# Patient Record
Sex: FEMALE | Race: BLACK OR AFRICAN AMERICAN | Employment: FULL TIME | URBAN - METROPOLITAN AREA
[De-identification: names, ages, dates, MRNs, and addresses within clinical notes are randomized per-mention and may not be internally consistent; named-entity substitution may affect disease eponyms.]

---

## 2017-01-28 ENCOUNTER — HOSPITAL ENCOUNTER (OUTPATIENT)
Dept: OTHER | Age: 37
Discharge: HOME | End: 2017-01-28
Attending: INTERNAL MEDICINE | Admitting: INTERNAL MEDICINE

## 2017-01-28 LAB
24HR URINE VOLUME (ML): 2250 ML
BACTERIA: ABNORMAL /HPF
BASOPHILS ABSOLUTE: 0.1 K/UL (ref 0–0.2)
BASOPHILS RELATIVE PERCENT: 1 %
BILIRUBIN URINE: NEGATIVE
BLOOD, URINE: ABNORMAL
CHOLESTEROL, TOTAL: 432 MG/DL (ref 0–199)
CLARITY: ABNORMAL
COLOR: YELLOW
CREAT SERPL-MCNC: 0.6 MG/DL (ref 0.6–1.2)
CREATININE 24 HOUR URINE: 1.6 G/24HR (ref 0.6–1.5)
CREATININE CLEARANCE: 192 ML/MIN (ref 88–128)
EOSINOPHILS ABSOLUTE: 0.1 K/UL (ref 0–0.6)
EOSINOPHILS RELATIVE PERCENT: 1 %
EPITHELIAL CELLS, UA: 10 /HPF (ref 0–5)
GLUCOSE URINE: NEGATIVE MG/DL
HCT VFR BLD CALC: 36.8 % (ref 36–48)
HDLC SERPL-MCNC: 91 MG/DL (ref 40–60)
HEMOGLOBIN: 12.1 G/DL (ref 12–16)
HYALINE CASTS: 2 /HPF (ref 0–8)
IRON SATURATION: 24 % (ref 15–50)
IRON: 59 UG/DL (ref 37–145)
KETONES, URINE: NEGATIVE MG/DL
LDL CHOLESTEROL CALCULATED: ABNORMAL MG/DL
LDL CHOLESTEROL DIRECT: 310 MG/DL
LEUKOCYTE ESTERASE, URINE: NEGATIVE
LYMPHOCYTES ABSOLUTE: 3.7 K/UL (ref 1–5.1)
LYMPHOCYTES RELATIVE PERCENT: 38 %
Lab: 24 HR
MCH RBC QN AUTO: 27.4 PG (ref 26–34)
MCHC RBC AUTO-ENTMCNC: 32.8 G/DL (ref 31–36)
MCV RBC AUTO: 83.5 FL (ref 80–100)
MICROSCOPIC EXAMINATION: YES
MONOCYTES ABSOLUTE: 0.6 K/UL (ref 0–1.3)
MONOCYTES RELATIVE PERCENT: 6 %
NEUTROPHILS ABSOLUTE: 5.3 K/UL (ref 1.7–7.7)
NEUTROPHILS RELATIVE PERCENT: 54 %
NITRITE, URINE: NEGATIVE
PDW BLD-RTO: 15.7 % (ref 12.4–15.4)
PH UA: 5.5
PLATELET # BLD: 359 K/UL (ref 135–450)
PLATELET SLIDE REVIEW: ADEQUATE
PMV BLD AUTO: 8.1 FL (ref 5–10.5)
PROTEIN 24 HOUR URINE: 7.29 G/24HR
PROTEIN UA: >300 MG/DL
RBC # BLD: 4.41 M/UL (ref 4–5.2)
RBC # BLD: NORMAL 10*6/UL
RBC UA: 3 /HPF (ref 0–4)
SLIDE REVIEW: ABNORMAL
SPECIFIC GRAVITY UA: 1.02
TOTAL IRON BINDING CAPACITY: 249 UG/DL (ref 260–445)
TRIGL SERPL-MCNC: 322 MG/DL (ref 0–150)
URINE TYPE: ABNORMAL
UROBILINOGEN, URINE: 0.2 E.U./DL
VLDLC SERPL CALC-MCNC: ABNORMAL MG/DL
WBC # BLD: 9.8 K/UL (ref 4–11)
WBC UA: 7 /HPF (ref 0–5)

## 2017-03-11 ENCOUNTER — HOSPITAL ENCOUNTER (OUTPATIENT)
Dept: OTHER | Age: 37
Discharge: HOME | End: 2017-03-11
Attending: INTERNAL MEDICINE | Admitting: INTERNAL MEDICINE

## 2017-03-11 LAB
A/G RATIO: 1.6 (ref 1.1–2.2)
ALBUMIN SERPL-MCNC: 4.1 G/DL (ref 3.4–5)
ALP BLD-CCNC: 41 U/L (ref 40–129)
ALT SERPL-CCNC: 19 U/L (ref 10–40)
ANION GAP SERPL CALCULATED.3IONS-SCNC: 15 MMOL/L (ref 3–16)
AST SERPL-CCNC: 10 U/L (ref 15–37)
BILIRUB SERPL-MCNC: <0.2 MG/DL (ref 0–1)
BUN BLDV-MCNC: 10 MG/DL (ref 7–20)
CALCIUM SERPL-MCNC: 9.1 MG/DL (ref 8.3–10.6)
CHLORIDE BLD-SCNC: 103 MMOL/L (ref 99–110)
CO2: 25 MMOL/L (ref 21–32)
CREAT SERPL-MCNC: 0.7 MG/DL (ref 0.6–1.1)
GFR AFRICAN AMERICAN: >60
GFR NON-AFRICAN AMERICAN: >60
GLOBULIN: 2.5 G/DL
GLUCOSE BLD-MCNC: 116 MG/DL (ref 70–99)
POTASSIUM SERPL-SCNC: 3.9 MMOL/L (ref 3.5–5.1)
SODIUM BLD-SCNC: 143 MMOL/L (ref 136–145)
TOTAL PROTEIN: 6.6 G/DL (ref 6.4–8.2)

## 2017-03-14 LAB
CHOLESTEROL, TOTAL: 300 MG/DL (ref 0–199)
HDLC SERPL-MCNC: 131 MG/DL (ref 40–60)
IRON: 45 UG/DL (ref 37–145)
LDL CHOLESTEROL CALCULATED: 146 MG/DL
T4 FREE: 1 NG/DL (ref 0.9–1.8)
TRIGL SERPL-MCNC: 115 MG/DL (ref 0–150)
TSH SERPL DL<=0.05 MIU/L-ACNC: 0.78 UIU/ML (ref 0.27–4.2)
VITAMIN D 25-HYDROXY: 14 NG/ML
VLDLC SERPL CALC-MCNC: 23 MG/DL

## 2017-04-20 ENCOUNTER — HOSPITAL ENCOUNTER (OUTPATIENT)
Dept: OTHER | Age: 37
Discharge: HOME | End: 2017-04-20
Attending: FAMILY MEDICINE | Admitting: FAMILY MEDICINE

## 2017-04-20 ENCOUNTER — HOSPITAL ENCOUNTER (OUTPATIENT)
Dept: OTHER | Age: 37
Discharge: HOME | End: 2017-04-20
Attending: INTERNAL MEDICINE | Admitting: INTERNAL MEDICINE

## 2017-04-20 LAB
ALBUMIN SERPL-MCNC: 4.4 G/DL (ref 3.4–5)
ANION GAP SERPL CALCULATED.3IONS-SCNC: 16 MMOL/L (ref 3–16)
BUN BLDV-MCNC: 5 MG/DL (ref 7–20)
CALCIUM SERPL-MCNC: 9.2 MG/DL (ref 8.3–10.6)
CHLORIDE BLD-SCNC: 98 MMOL/L (ref 99–110)
CHOLESTEROL, TOTAL: 294 MG/DL (ref 0–199)
CO2: 24 MMOL/L (ref 21–32)
CREAT SERPL-MCNC: 0.5 MG/DL (ref 0.6–1.1)
GFR AFRICAN AMERICAN: >60
GFR NON-AFRICAN AMERICAN: >60
GLUCOSE BLD-MCNC: 231 MG/DL (ref 70–99)
HCT VFR BLD CALC: 37.4 % (ref 36–48)
HDLC SERPL-MCNC: 55 MG/DL (ref 40–60)
HEMOGLOBIN: 12.5 G/DL (ref 12–16)
IRON: 81 UG/DL (ref 37–145)
LDL CHOLESTEROL CALCULATED: ABNORMAL MG/DL
LDL CHOLESTEROL DIRECT: 181 MG/DL
MCH RBC QN AUTO: 27.9 PG (ref 26–34)
MCHC RBC AUTO-ENTMCNC: 33.3 G/DL (ref 31–36)
MCV RBC AUTO: 83.7 FL (ref 80–100)
PDW BLD-RTO: 14.4 % (ref 12.4–15.4)
PHOSPHORUS: 2.5 MG/DL (ref 2.5–4.9)
PLATELET # BLD: 287 K/UL (ref 135–450)
PMV BLD AUTO: 8.6 FL (ref 5–10.5)
POTASSIUM SERPL-SCNC: 4.1 MMOL/L (ref 3.5–5.1)
RBC # BLD: 4.47 M/UL (ref 4–5.2)
SODIUM BLD-SCNC: 138 MMOL/L (ref 136–145)
T4 FREE: 0.9 NG/DL (ref 0.9–1.8)
TRIGL SERPL-MCNC: 393 MG/DL (ref 0–150)
TSH SERPL DL<=0.05 MIU/L-ACNC: 0.39 UIU/ML (ref 0.27–4.2)
VITAMIN D 25-HYDROXY: 14.9 NG/ML
VLDLC SERPL CALC-MCNC: ABNORMAL MG/DL
WBC # BLD: 6.9 K/UL (ref 4–11)

## 2017-04-21 LAB
ESTIMATED AVERAGE GLUCOSE: 274.7 MG/DL
HBA1C MFR BLD: 11.2 %

## 2017-06-27 ENCOUNTER — HOSPITAL ENCOUNTER (OUTPATIENT)
Dept: OTHER | Age: 37
Discharge: HOME | End: 2017-06-27
Attending: INTERNAL MEDICINE | Admitting: INTERNAL MEDICINE

## 2017-06-27 LAB
ALBUMIN SERPL-MCNC: 4.2 G/DL (ref 3.4–5)
ANION GAP SERPL CALCULATED.3IONS-SCNC: 15 MMOL/L (ref 3–16)
BUN BLDV-MCNC: 6 MG/DL (ref 7–20)
CALCIUM SERPL-MCNC: 8.9 MG/DL (ref 8.3–10.6)
CHLORIDE BLD-SCNC: 101 MMOL/L (ref 99–110)
CO2: 24 MMOL/L (ref 21–32)
CREAT SERPL-MCNC: 0.5 MG/DL (ref 0.6–1.1)
GFR AFRICAN AMERICAN: >60
GFR NON-AFRICAN AMERICAN: >60
GLUCOSE BLD-MCNC: 135 MG/DL (ref 70–99)
MAGNESIUM: 1.8 MG/DL (ref 1.8–2.4)
PHOSPHORUS: 3.2 MG/DL (ref 2.5–4.9)
POTASSIUM SERPL-SCNC: 4.2 MMOL/L (ref 3.5–5.1)
SODIUM BLD-SCNC: 140 MMOL/L (ref 136–145)

## 2018-01-04 ENCOUNTER — HOSPITAL ENCOUNTER (OUTPATIENT)
Dept: OTHER | Age: 38
Discharge: HOME | End: 2018-01-04
Attending: FAMILY MEDICINE | Admitting: FAMILY MEDICINE

## 2018-01-04 LAB
A/G RATIO: 1.4 (ref 1.1–2.2)
ALBUMIN SERPL-MCNC: 4.3 G/DL (ref 3.4–5)
ALP BLD-CCNC: 61 U/L (ref 40–129)
ALT SERPL-CCNC: 13 U/L (ref 10–40)
ANION GAP SERPL CALCULATED.3IONS-SCNC: 16 MMOL/L (ref 3–16)
AST SERPL-CCNC: 11 U/L (ref 15–37)
BILIRUB SERPL-MCNC: 0.4 MG/DL (ref 0–1)
BUN BLDV-MCNC: 9 MG/DL (ref 7–20)
CALCIUM SERPL-MCNC: 9.4 MG/DL (ref 8.3–10.6)
CHLORIDE BLD-SCNC: 101 MMOL/L (ref 99–110)
CHOLESTEROL, TOTAL: 224 MG/DL (ref 0–199)
CO2: 23 MMOL/L (ref 21–32)
CREAT SERPL-MCNC: 0.6 MG/DL (ref 0.6–1.1)
GFR AFRICAN AMERICAN: >60
GFR NON-AFRICAN AMERICAN: >60
GLOBULIN: 3.1 G/DL
GLUCOSE BLD-MCNC: 155 MG/DL (ref 70–99)
HCT VFR BLD CALC: 36.1 % (ref 36–48)
HDLC SERPL-MCNC: 66 MG/DL (ref 40–60)
HEMOGLOBIN: 11.9 G/DL (ref 12–16)
IRON: 73 UG/DL (ref 37–145)
LDL CHOLESTEROL CALCULATED: 122 MG/DL
MCH RBC QN AUTO: 26.9 PG (ref 26–34)
MCHC RBC AUTO-ENTMCNC: 33 G/DL (ref 31–36)
MCV RBC AUTO: 81.4 FL (ref 80–100)
PDW BLD-RTO: 15.8 % (ref 12.4–15.4)
PLATELET # BLD: 334 K/UL (ref 135–450)
PMV BLD AUTO: 8.2 FL (ref 5–10.5)
POTASSIUM SERPL-SCNC: 4.3 MMOL/L (ref 3.5–5.1)
RBC # BLD: 4.43 M/UL (ref 4–5.2)
SODIUM BLD-SCNC: 140 MMOL/L (ref 136–145)
T4 FREE: 0.9 NG/DL (ref 0.9–1.8)
TOTAL PROTEIN: 7.4 G/DL (ref 6.4–8.2)
TRIGL SERPL-MCNC: 180 MG/DL (ref 0–150)
TSH SERPL DL<=0.05 MIU/L-ACNC: 1.27 UIU/ML (ref 0.27–4.2)
VITAMIN D 25-HYDROXY: 21.4 NG/ML
VLDLC SERPL CALC-MCNC: 36 MG/DL
WBC # BLD: 5.9 K/UL (ref 4–11)

## 2018-01-05 LAB
ANA INTERPRETATION: NORMAL
ANTI-NUCLEAR ANTIBODY (ANA): NEGATIVE
ESTIMATED AVERAGE GLUCOSE: 203 MG/DL
HBA1C MFR BLD: 8.7 %

## 2018-05-24 ENCOUNTER — HOSPITAL ENCOUNTER (OUTPATIENT)
Dept: OTHER | Age: 38
Discharge: HOME | End: 2018-05-24
Attending: FAMILY MEDICINE | Admitting: FAMILY MEDICINE

## 2018-05-24 LAB
A/G RATIO: 1.4 (ref 1.1–2.2)
ALBUMIN SERPL-MCNC: 4 G/DL (ref 3.4–5)
ALP BLD-CCNC: 55 U/L (ref 40–129)
ALT SERPL-CCNC: 13 U/L (ref 10–40)
ANION GAP SERPL CALCULATED.3IONS-SCNC: 16 MMOL/L (ref 3–16)
AST SERPL-CCNC: 16 U/L (ref 15–37)
BILIRUB SERPL-MCNC: 0.4 MG/DL (ref 0–1)
BUN BLDV-MCNC: 7 MG/DL (ref 7–20)
CALCIUM SERPL-MCNC: 8.7 MG/DL (ref 8.3–10.6)
CHLORIDE BLD-SCNC: 101 MMOL/L (ref 99–110)
CHOLESTEROL, TOTAL: 244 MG/DL (ref 0–199)
CO2: 24 MMOL/L (ref 21–32)
CREAT SERPL-MCNC: 0.6 MG/DL (ref 0.6–1.1)
ESTIMATED AVERAGE GLUCOSE: 180 MG/DL
GFR AFRICAN AMERICAN: >60
GFR NON-AFRICAN AMERICAN: >60
GLOBULIN: 2.8 G/DL
GLUCOSE BLD-MCNC: 165 MG/DL (ref 70–99)
HBA1C MFR BLD: 7.9 %
HDLC SERPL-MCNC: 59 MG/DL (ref 40–60)
LDL CHOLESTEROL CALCULATED: 152 MG/DL
POTASSIUM SERPL-SCNC: 4.3 MMOL/L (ref 3.5–5.1)
SODIUM BLD-SCNC: 141 MMOL/L (ref 136–145)
TOTAL PROTEIN: 6.8 G/DL (ref 6.4–8.2)
TRIGL SERPL-MCNC: 167 MG/DL (ref 0–150)
VITAMIN B-12: 446 PG/ML (ref 211–911)
VITAMIN D 25-HYDROXY: 18.8 NG/ML
VLDLC SERPL CALC-MCNC: 33 MG/DL

## 2018-05-25 LAB
ANA INTERPRETATION: NORMAL
ANTI-NUCLEAR ANTIBODY (ANA): NEGATIVE

## 2018-12-08 ENCOUNTER — HOSPITAL ENCOUNTER (OUTPATIENT)
Age: 38
Discharge: HOME | End: 2018-12-08
Payer: COMMERCIAL

## 2018-12-08 LAB
A/G RATIO: 1.2 (ref 1.1–2.2)
ALBUMIN SERPL-MCNC: 4.1 G/DL (ref 3.4–5)
ALP BLD-CCNC: 68 U/L (ref 40–129)
ALT SERPL-CCNC: 10 U/L (ref 10–40)
ANION GAP SERPL CALCULATED.3IONS-SCNC: 14 MMOL/L (ref 3–16)
AST SERPL-CCNC: 13 U/L (ref 15–37)
BACTERIA: ABNORMAL /HPF
BILIRUB SERPL-MCNC: 0.4 MG/DL (ref 0–1)
BILIRUBIN URINE: NEGATIVE
BLOOD, URINE: NEGATIVE
BUN BLDV-MCNC: 8 MG/DL (ref 7–20)
CALCIUM SERPL-MCNC: 9.8 MG/DL (ref 8.3–10.6)
CHLORIDE BLD-SCNC: 105 MMOL/L (ref 99–110)
CHOLESTEROL, TOTAL: 183 MG/DL (ref 0–199)
CLARITY: ABNORMAL
CO2: 23 MMOL/L (ref 21–32)
COLOR: YELLOW
CREAT SERPL-MCNC: 0.7 MG/DL (ref 0.6–1.1)
CREATININE URINE: 169.9 MG/DL (ref 28–259)
EPITHELIAL CELLS, UA: 5 /HPF (ref 0–5)
FERRITIN: 26.9 NG/ML (ref 15–150)
GFR AFRICAN AMERICAN: >60
GFR NON-AFRICAN AMERICAN: >60
GLOBULIN: 3.3 G/DL
GLUCOSE BLD-MCNC: 141 MG/DL (ref 70–99)
GLUCOSE URINE: NEGATIVE MG/DL
HCT VFR BLD CALC: 37.3 % (ref 36–48)
HDLC SERPL-MCNC: 67 MG/DL (ref 40–60)
HEMOGLOBIN: 12.3 G/DL (ref 12–16)
HYALINE CASTS: 0 /LPF (ref 0–8)
IRON SATURATION: 19 % (ref 15–50)
IRON: 81 UG/DL (ref 37–145)
KETONES, URINE: NEGATIVE MG/DL
LDL CHOLESTEROL CALCULATED: 92 MG/DL
LEUKOCYTE ESTERASE, URINE: ABNORMAL
MCH RBC QN AUTO: 25.7 PG (ref 26–34)
MCHC RBC AUTO-ENTMCNC: 33 G/DL (ref 31–36)
MCV RBC AUTO: 77.7 FL (ref 80–100)
MICROSCOPIC EXAMINATION: YES
NITRITE, URINE: NEGATIVE
PDW BLD-RTO: 15.2 % (ref 12.4–15.4)
PH UA: 5.5
PHOSPHORUS: 3.7 MG/DL (ref 2.5–4.9)
PLATELET # BLD: 351 K/UL (ref 135–450)
PMV BLD AUTO: 8.4 FL (ref 5–10.5)
POTASSIUM SERPL-SCNC: 4.4 MMOL/L (ref 3.5–5.1)
PROTEIN PROTEIN: 18 MG/DL
PROTEIN UA: NEGATIVE MG/DL
RBC # BLD: 4.79 M/UL (ref 4–5.2)
RBC UA: 3 /HPF (ref 0–4)
SODIUM BLD-SCNC: 142 MMOL/L (ref 136–145)
SPECIFIC GRAVITY UA: 1.02
TOTAL IRON BINDING CAPACITY: 419 UG/DL (ref 260–445)
TOTAL PROTEIN: 7.4 G/DL (ref 6.4–8.2)
TRIGL SERPL-MCNC: 121 MG/DL (ref 0–150)
URINE TYPE: ABNORMAL
UROBILINOGEN, URINE: 0.2 E.U./DL
VITAMIN D 25-HYDROXY: 22.4 NG/ML
VLDLC SERPL CALC-MCNC: 24 MG/DL
WBC # BLD: 5.9 K/UL (ref 4–11)
WBC UA: 5 /HPF (ref 0–5)

## 2018-12-08 PROCEDURE — 82306 VITAMIN D 25 HYDROXY: CPT

## 2018-12-08 PROCEDURE — 80061 LIPID PANEL: CPT

## 2018-12-08 PROCEDURE — 81001 URINALYSIS AUTO W/SCOPE: CPT

## 2018-12-08 PROCEDURE — 85027 COMPLETE CBC AUTOMATED: CPT

## 2018-12-08 PROCEDURE — 83550 IRON BINDING TEST: CPT

## 2018-12-08 PROCEDURE — 83036 HEMOGLOBIN GLYCOSYLATED A1C: CPT

## 2018-12-08 PROCEDURE — 36415 COLL VENOUS BLD VENIPUNCTURE: CPT

## 2018-12-08 PROCEDURE — 83540 ASSAY OF IRON: CPT

## 2018-12-08 PROCEDURE — 84100 ASSAY OF PHOSPHORUS: CPT

## 2018-12-08 PROCEDURE — 82728 ASSAY OF FERRITIN: CPT

## 2018-12-08 PROCEDURE — 82570 ASSAY OF URINE CREATININE: CPT

## 2018-12-08 PROCEDURE — 84156 ASSAY OF PROTEIN URINE: CPT

## 2018-12-08 PROCEDURE — 80053 COMPREHEN METABOLIC PANEL: CPT

## 2018-12-09 LAB
ESTIMATED AVERAGE GLUCOSE: 200.1 MG/DL
HBA1C MFR BLD: 8.6 %

## 2019-04-18 PROBLEM — I10 ESSENTIAL HYPERTENSION: Status: ACTIVE | Noted: 2019-04-18

## 2020-01-15 ENCOUNTER — APPOINTMENT (OUTPATIENT)
Dept: GENERAL RADIOLOGY | Age: 40
End: 2020-01-15

## 2020-01-15 ENCOUNTER — HOSPITAL ENCOUNTER (OUTPATIENT)
Age: 40
Setting detail: OBSERVATION
Discharge: HOME | End: 2020-01-16
Attending: EMERGENCY MEDICINE | Admitting: HOSPITALIST

## 2020-01-15 PROBLEM — R55 SYNCOPE AND COLLAPSE: Status: ACTIVE | Noted: 2020-01-15

## 2020-01-15 LAB
ANION GAP SERPL CALCULATED.3IONS-SCNC: 13 MMOL/L (ref 3–16)
BACTERIA: ABNORMAL /HPF
BASOPHILS ABSOLUTE: 0.1 K/UL (ref 0–0.2)
BASOPHILS RELATIVE PERCENT: 0.9 %
BILIRUBIN URINE: NEGATIVE
BLOOD, URINE: ABNORMAL
BUN BLDV-MCNC: 15 MG/DL (ref 7–20)
CALCIUM SERPL-MCNC: 8.2 MG/DL (ref 8.3–10.6)
CHLORIDE BLD-SCNC: 89 MMOL/L (ref 99–110)
CLARITY: ABNORMAL
CO2: 21 MMOL/L (ref 21–32)
COLOR: YELLOW
COMMENT UA: ABNORMAL
CREAT SERPL-MCNC: 0.8 MG/DL (ref 0.6–1.1)
EOSINOPHILS ABSOLUTE: 0 K/UL (ref 0–0.6)
EOSINOPHILS RELATIVE PERCENT: 0.1 %
EPITHELIAL CELLS, UA: ABNORMAL /HPF
GFR AFRICAN AMERICAN: >60
GFR NON-AFRICAN AMERICAN: >60
GLUCOSE BLD-MCNC: 358 MG/DL (ref 70–99)
GLUCOSE BLD-MCNC: 392 MG/DL (ref 70–99)
GLUCOSE BLD-MCNC: 418 MG/DL (ref 70–99)
GLUCOSE URINE: >=1000 MG/DL
HCG QUALITATIVE: NEGATIVE
HCT VFR BLD CALC: 38.8 % (ref 36–48)
HEMOGLOBIN: 12.8 G/DL (ref 12–16)
KETONES, URINE: NEGATIVE MG/DL
LEUKOCYTE ESTERASE, URINE: NEGATIVE
LIPASE: 34 U/L (ref 13–60)
LYMPHOCYTES ABSOLUTE: 2.1 K/UL (ref 1–5.1)
LYMPHOCYTES RELATIVE PERCENT: 30.7 %
MCH RBC QN AUTO: 25.3 PG (ref 26–34)
MCHC RBC AUTO-ENTMCNC: 32.9 G/DL (ref 31–36)
MCV RBC AUTO: 76.9 FL (ref 80–100)
MICROSCOPIC EXAMINATION: YES
MONOCYTES ABSOLUTE: 0.5 K/UL (ref 0–1.3)
MONOCYTES RELATIVE PERCENT: 7.3 %
NEUTROPHILS ABSOLUTE: 4.2 K/UL (ref 1.7–7.7)
NEUTROPHILS RELATIVE PERCENT: 61 %
NITRITE, URINE: NEGATIVE
PDW BLD-RTO: 16.7 % (ref 12.4–15.4)
PERFORMED ON: ABNORMAL
PERFORMED ON: ABNORMAL
PH UA: 6.5 (ref 5–8)
PLATELET # BLD: 476 K/UL (ref 135–450)
PMV BLD AUTO: 7.5 FL (ref 5–10.5)
POTASSIUM SERPL-SCNC: 4.2 MMOL/L (ref 3.5–5.1)
PROTEIN UA: >300 MG/DL
RBC # BLD: 5.05 M/UL (ref 4–5.2)
RBC UA: ABNORMAL /HPF (ref 0–2)
REASON FOR REJECTION: NORMAL
REJECTED TEST: NORMAL
SODIUM BLD-SCNC: 123 MMOL/L (ref 136–145)
SPECIFIC GRAVITY UA: >1.03 (ref 1–1.03)
TROPONIN: <0.01 NG/ML
URINE REFLEX TO CULTURE: ABNORMAL
URINE TYPE: ABNORMAL
UROBILINOGEN, URINE: 0.2 E.U./DL
WBC # BLD: 6.8 K/UL (ref 4–11)
WBC UA: ABNORMAL /HPF (ref 0–5)
YEAST: PRESENT /HPF

## 2020-01-15 PROCEDURE — 6370000000 HC RX 637 (ALT 250 FOR IP): Performed by: EMERGENCY MEDICINE

## 2020-01-15 PROCEDURE — 83036 HEMOGLOBIN GLYCOSYLATED A1C: CPT

## 2020-01-15 PROCEDURE — 84703 CHORIONIC GONADOTROPIN ASSAY: CPT

## 2020-01-15 PROCEDURE — 71046 X-RAY EXAM CHEST 2 VIEWS: CPT

## 2020-01-15 PROCEDURE — 6360000002 HC RX W HCPCS: Performed by: EMERGENCY MEDICINE

## 2020-01-15 PROCEDURE — 83690 ASSAY OF LIPASE: CPT

## 2020-01-15 PROCEDURE — 94640 AIRWAY INHALATION TREATMENT: CPT

## 2020-01-15 PROCEDURE — 80048 BASIC METABOLIC PNL TOTAL CA: CPT

## 2020-01-15 PROCEDURE — 93005 ELECTROCARDIOGRAM TRACING: CPT | Performed by: EMERGENCY MEDICINE

## 2020-01-15 PROCEDURE — 96374 THER/PROPH/DIAG INJ IV PUSH: CPT

## 2020-01-15 PROCEDURE — G0378 HOSPITAL OBSERVATION PER HR: HCPCS

## 2020-01-15 PROCEDURE — 2580000003 HC RX 258: Performed by: EMERGENCY MEDICINE

## 2020-01-15 PROCEDURE — 84484 ASSAY OF TROPONIN QUANT: CPT

## 2020-01-15 PROCEDURE — 36415 COLL VENOUS BLD VENIPUNCTURE: CPT

## 2020-01-15 PROCEDURE — 99285 EMERGENCY DEPT VISIT HI MDM: CPT

## 2020-01-15 PROCEDURE — 94761 N-INVAS EAR/PLS OXIMETRY MLT: CPT

## 2020-01-15 PROCEDURE — 85025 COMPLETE CBC W/AUTO DIFF WBC: CPT

## 2020-01-15 PROCEDURE — 81001 URINALYSIS AUTO W/SCOPE: CPT

## 2020-01-15 RX ORDER — INSULIN LISPRO 100 [IU]/ML
0-6 INJECTION, SOLUTION INTRAVENOUS; SUBCUTANEOUS NIGHTLY
Status: DISCONTINUED | OUTPATIENT
Start: 2020-01-16 | End: 2020-01-16 | Stop reason: DRUGHIGH

## 2020-01-15 RX ORDER — MAGNESIUM SULFATE 1 G/100ML
1 INJECTION INTRAVENOUS PRN
Status: DISCONTINUED | OUTPATIENT
Start: 2020-01-15 | End: 2020-01-16 | Stop reason: HOSPADM

## 2020-01-15 RX ORDER — DEXTROSE MONOHYDRATE 50 MG/ML
100 INJECTION, SOLUTION INTRAVENOUS PRN
Status: DISCONTINUED | OUTPATIENT
Start: 2020-01-15 | End: 2020-01-16 | Stop reason: HOSPADM

## 2020-01-15 RX ORDER — METHYLPREDNISOLONE SODIUM SUCCINATE 125 MG/2ML
125 INJECTION, POWDER, LYOPHILIZED, FOR SOLUTION INTRAMUSCULAR; INTRAVENOUS ONCE
Status: COMPLETED | OUTPATIENT
Start: 2020-01-15 | End: 2020-01-15

## 2020-01-15 RX ORDER — POTASSIUM CHLORIDE 7.45 MG/ML
10 INJECTION INTRAVENOUS PRN
Status: DISCONTINUED | OUTPATIENT
Start: 2020-01-15 | End: 2020-01-16 | Stop reason: HOSPADM

## 2020-01-15 RX ORDER — NICOTINE POLACRILEX 4 MG
15 LOZENGE BUCCAL PRN
Status: DISCONTINUED | OUTPATIENT
Start: 2020-01-15 | End: 2020-01-16 | Stop reason: HOSPADM

## 2020-01-15 RX ORDER — 0.9 % SODIUM CHLORIDE 0.9 %
1000 INTRAVENOUS SOLUTION INTRAVENOUS ONCE
Status: COMPLETED | OUTPATIENT
Start: 2020-01-15 | End: 2020-01-15

## 2020-01-15 RX ORDER — ALBUTEROL SULFATE 2.5 MG/3ML
5 SOLUTION RESPIRATORY (INHALATION) ONCE
Status: COMPLETED | OUTPATIENT
Start: 2020-01-15 | End: 2020-01-15

## 2020-01-15 RX ORDER — INSULIN LISPRO 100 [IU]/ML
0-12 INJECTION, SOLUTION INTRAVENOUS; SUBCUTANEOUS
Status: DISCONTINUED | OUTPATIENT
Start: 2020-01-16 | End: 2020-01-16 | Stop reason: DRUGHIGH

## 2020-01-15 RX ORDER — DEXTROSE MONOHYDRATE 25 G/50ML
12.5 INJECTION, SOLUTION INTRAVENOUS PRN
Status: DISCONTINUED | OUTPATIENT
Start: 2020-01-15 | End: 2020-01-16 | Stop reason: HOSPADM

## 2020-01-15 RX ORDER — ONDANSETRON 2 MG/ML
4 INJECTION INTRAMUSCULAR; INTRAVENOUS EVERY 6 HOURS PRN
Status: DISCONTINUED | OUTPATIENT
Start: 2020-01-15 | End: 2020-01-16 | Stop reason: HOSPADM

## 2020-01-15 RX ORDER — POTASSIUM CHLORIDE 20 MEQ/1
40 TABLET, EXTENDED RELEASE ORAL PRN
Status: DISCONTINUED | OUTPATIENT
Start: 2020-01-15 | End: 2020-01-16 | Stop reason: HOSPADM

## 2020-01-15 RX ORDER — FLUCONAZOLE 100 MG/1
150 TABLET ORAL ONCE
Status: COMPLETED | OUTPATIENT
Start: 2020-01-15 | End: 2020-01-15

## 2020-01-15 RX ORDER — IPRATROPIUM BROMIDE AND ALBUTEROL SULFATE 2.5; .5 MG/3ML; MG/3ML
1 SOLUTION RESPIRATORY (INHALATION) EVERY 6 HOURS
Status: DISCONTINUED | OUTPATIENT
Start: 2020-01-16 | End: 2020-01-16 | Stop reason: HOSPADM

## 2020-01-15 RX ORDER — IPRATROPIUM BROMIDE AND ALBUTEROL SULFATE 2.5; .5 MG/3ML; MG/3ML
1 SOLUTION RESPIRATORY (INHALATION) ONCE
Status: COMPLETED | OUTPATIENT
Start: 2020-01-15 | End: 2020-01-15

## 2020-01-15 RX ORDER — IPRATROPIUM BROMIDE AND ALBUTEROL SULFATE 2.5; .5 MG/3ML; MG/3ML
1 SOLUTION RESPIRATORY (INHALATION) EVERY 4 HOURS PRN
Status: DISCONTINUED | OUTPATIENT
Start: 2020-01-15 | End: 2020-01-16 | Stop reason: HOSPADM

## 2020-01-15 RX ORDER — SODIUM CHLORIDE 0.9 % (FLUSH) 0.9 %
10 SYRINGE (ML) INJECTION EVERY 12 HOURS SCHEDULED
Status: DISCONTINUED | OUTPATIENT
Start: 2020-01-16 | End: 2020-01-16 | Stop reason: HOSPADM

## 2020-01-15 RX ORDER — FAMOTIDINE 20 MG/1
20 TABLET, FILM COATED ORAL 2 TIMES DAILY
Status: DISCONTINUED | OUTPATIENT
Start: 2020-01-16 | End: 2020-01-16 | Stop reason: HOSPADM

## 2020-01-15 RX ORDER — ACETAMINOPHEN 325 MG/1
650 TABLET ORAL EVERY 4 HOURS PRN
Status: DISCONTINUED | OUTPATIENT
Start: 2020-01-15 | End: 2020-01-16 | Stop reason: HOSPADM

## 2020-01-15 RX ORDER — SODIUM CHLORIDE 0.9 % (FLUSH) 0.9 %
10 SYRINGE (ML) INJECTION PRN
Status: DISCONTINUED | OUTPATIENT
Start: 2020-01-15 | End: 2020-01-16 | Stop reason: HOSPADM

## 2020-01-15 RX ORDER — SODIUM CHLORIDE 9 MG/ML
INJECTION, SOLUTION INTRAVENOUS CONTINUOUS
Status: DISCONTINUED | OUTPATIENT
Start: 2020-01-16 | End: 2020-01-16 | Stop reason: HOSPADM

## 2020-01-15 RX ADMIN — SODIUM CHLORIDE 1000 ML: 9 INJECTION, SOLUTION INTRAVENOUS at 21:41

## 2020-01-15 RX ADMIN — IPRATROPIUM BROMIDE AND ALBUTEROL SULFATE 1 AMPULE: .5; 3 SOLUTION RESPIRATORY (INHALATION) at 20:30

## 2020-01-15 RX ADMIN — ALBUTEROL SULFATE 5 MG: 2.5 SOLUTION RESPIRATORY (INHALATION) at 20:30

## 2020-01-15 RX ADMIN — METHYLPREDNISOLONE SODIUM SUCCINATE 125 MG: 125 INJECTION, POWDER, FOR SOLUTION INTRAMUSCULAR; INTRAVENOUS at 21:41

## 2020-01-15 RX ADMIN — FLUCONAZOLE 150 MG: 100 TABLET ORAL at 22:30

## 2020-01-15 ASSESSMENT — PAIN SCALES - GENERAL: PAINLEVEL_OUTOF10: 10

## 2020-01-15 ASSESSMENT — PAIN DESCRIPTION - LOCATION: LOCATION: ABDOMEN

## 2020-01-15 ASSESSMENT — PAIN DESCRIPTION - ORIENTATION: ORIENTATION: LOWER

## 2020-01-15 ASSESSMENT — PAIN DESCRIPTION - PAIN TYPE: TYPE: ACUTE PAIN

## 2020-01-15 NOTE — ED NOTES
Bed: 21  Expected date:   Expected time:   Means of arrival:   Comments:  Medic 4569 Chivo Hernandez RN  01/15/20 9369

## 2020-01-16 VITALS
HEIGHT: 59 IN | OXYGEN SATURATION: 99 % | DIASTOLIC BLOOD PRESSURE: 89 MMHG | SYSTOLIC BLOOD PRESSURE: 135 MMHG | RESPIRATION RATE: 16 BRPM | WEIGHT: 153.2 LBS | TEMPERATURE: 97.7 F | BODY MASS INDEX: 30.88 KG/M2 | HEART RATE: 90 BPM

## 2020-01-16 LAB
A/G RATIO: 0.3 (ref 1.1–2.2)
ALBUMIN SERPL-MCNC: 1.1 G/DL (ref 3.4–5)
ALP BLD-CCNC: 72 U/L (ref 40–129)
ALT SERPL-CCNC: 11 U/L (ref 10–40)
ANION GAP SERPL CALCULATED.3IONS-SCNC: 13 MMOL/L (ref 3–16)
AST SERPL-CCNC: 14 U/L (ref 15–37)
BASOPHILS ABSOLUTE: 0 K/UL (ref 0–0.2)
BASOPHILS RELATIVE PERCENT: 0.4 %
BILIRUB SERPL-MCNC: <0.2 MG/DL (ref 0–1)
BUN BLDV-MCNC: 14 MG/DL (ref 7–20)
CALCIUM SERPL-MCNC: 7.6 MG/DL (ref 8.3–10.6)
CHLORIDE BLD-SCNC: 98 MMOL/L (ref 99–110)
CO2: 18 MMOL/L (ref 21–32)
CREAT SERPL-MCNC: 0.7 MG/DL (ref 0.6–1.1)
EKG ATRIAL RATE: 97 BPM
EKG DIAGNOSIS: NORMAL
EKG P AXIS: 32 DEGREES
EKG P-R INTERVAL: 140 MS
EKG Q-T INTERVAL: 362 MS
EKG QRS DURATION: 82 MS
EKG QTC CALCULATION (BAZETT): 459 MS
EKG R AXIS: 27 DEGREES
EKG T AXIS: 0 DEGREES
EKG VENTRICULAR RATE: 97 BPM
EOSINOPHILS ABSOLUTE: 0 K/UL (ref 0–0.6)
EOSINOPHILS RELATIVE PERCENT: 0 %
ESTIMATED AVERAGE GLUCOSE: 257.5 MG/DL
GFR AFRICAN AMERICAN: >60
GFR NON-AFRICAN AMERICAN: >60
GLOBULIN: 3.8 G/DL
GLUCOSE BLD-MCNC: 332 MG/DL (ref 70–99)
GLUCOSE BLD-MCNC: 387 MG/DL (ref 70–99)
GLUCOSE BLD-MCNC: 436 MG/DL (ref 70–99)
GLUCOSE BLD-MCNC: 491 MG/DL (ref 70–99)
HBA1C MFR BLD: 10.6 %
HCT VFR BLD CALC: 34.6 % (ref 36–48)
HEMOGLOBIN: 11.5 G/DL (ref 12–16)
LV EF: 55 %
LVEF MODALITY: NORMAL
LYMPHOCYTES ABSOLUTE: 0.8 K/UL (ref 1–5.1)
LYMPHOCYTES RELATIVE PERCENT: 18.4 %
MAGNESIUM: 1.8 MG/DL (ref 1.8–2.4)
MCH RBC QN AUTO: 25.3 PG (ref 26–34)
MCHC RBC AUTO-ENTMCNC: 33.3 G/DL (ref 31–36)
MCV RBC AUTO: 75.9 FL (ref 80–100)
MONOCYTES ABSOLUTE: 0.1 K/UL (ref 0–1.3)
MONOCYTES RELATIVE PERCENT: 2 %
NEUTROPHILS ABSOLUTE: 3.6 K/UL (ref 1.7–7.7)
NEUTROPHILS RELATIVE PERCENT: 79.2 %
PDW BLD-RTO: 16.2 % (ref 12.4–15.4)
PERFORMED ON: ABNORMAL
PLATELET # BLD: 430 K/UL (ref 135–450)
PMV BLD AUTO: 7.5 FL (ref 5–10.5)
POTASSIUM REFLEX MAGNESIUM: 4.3 MMOL/L (ref 3.5–5.1)
RBC # BLD: 4.56 M/UL (ref 4–5.2)
SODIUM BLD-SCNC: 129 MMOL/L (ref 136–145)
TOTAL PROTEIN: 4.9 G/DL (ref 6.4–8.2)
TROPONIN: <0.01 NG/ML
TROPONIN: <0.01 NG/ML
WBC # BLD: 4.5 K/UL (ref 4–11)

## 2020-01-16 PROCEDURE — 85025 COMPLETE CBC W/AUTO DIFF WBC: CPT

## 2020-01-16 PROCEDURE — 6370000000 HC RX 637 (ALT 250 FOR IP): Performed by: HOSPITALIST

## 2020-01-16 PROCEDURE — 93880 EXTRACRANIAL BILAT STUDY: CPT

## 2020-01-16 PROCEDURE — 83036 HEMOGLOBIN GLYCOSYLATED A1C: CPT

## 2020-01-16 PROCEDURE — 6370000000 HC RX 637 (ALT 250 FOR IP): Performed by: INTERNAL MEDICINE

## 2020-01-16 PROCEDURE — 93010 ELECTROCARDIOGRAM REPORT: CPT | Performed by: INTERNAL MEDICINE

## 2020-01-16 PROCEDURE — 2580000003 HC RX 258: Performed by: HOSPITALIST

## 2020-01-16 PROCEDURE — 97116 GAIT TRAINING THERAPY: CPT

## 2020-01-16 PROCEDURE — 83735 ASSAY OF MAGNESIUM: CPT

## 2020-01-16 PROCEDURE — 96372 THER/PROPH/DIAG INJ SC/IM: CPT

## 2020-01-16 PROCEDURE — 94761 N-INVAS EAR/PLS OXIMETRY MLT: CPT

## 2020-01-16 PROCEDURE — G0378 HOSPITAL OBSERVATION PER HR: HCPCS

## 2020-01-16 PROCEDURE — 97161 PT EVAL LOW COMPLEX 20 MIN: CPT

## 2020-01-16 PROCEDURE — 84484 ASSAY OF TROPONIN QUANT: CPT

## 2020-01-16 PROCEDURE — 80053 COMPREHEN METABOLIC PANEL: CPT

## 2020-01-16 PROCEDURE — 6360000002 HC RX W HCPCS: Performed by: HOSPITALIST

## 2020-01-16 PROCEDURE — 93306 TTE W/DOPPLER COMPLETE: CPT

## 2020-01-16 PROCEDURE — 94640 AIRWAY INHALATION TREATMENT: CPT

## 2020-01-16 PROCEDURE — 36415 COLL VENOUS BLD VENIPUNCTURE: CPT

## 2020-01-16 RX ORDER — INSULIN LISPRO 100 [IU]/ML
0-18 INJECTION, SOLUTION INTRAVENOUS; SUBCUTANEOUS
Qty: 1 PEN | Refills: 1 | Status: SHIPPED | OUTPATIENT
Start: 2020-01-16 | End: 2021-11-22

## 2020-01-16 RX ORDER — INSULIN LISPRO 100 [IU]/ML
0-9 INJECTION, SOLUTION INTRAVENOUS; SUBCUTANEOUS NIGHTLY
Status: DISCONTINUED | OUTPATIENT
Start: 2020-01-16 | End: 2020-01-16 | Stop reason: HOSPADM

## 2020-01-16 RX ORDER — BLOOD-GLUCOSE METER
1 KIT MISCELLANEOUS DAILY
Qty: 1 KIT | Refills: 0 | Status: SHIPPED | OUTPATIENT
Start: 2020-01-16 | End: 2021-11-22

## 2020-01-16 RX ORDER — INSULIN LISPRO 100 [IU]/ML
0-9 INJECTION, SOLUTION INTRAVENOUS; SUBCUTANEOUS NIGHTLY
Qty: 1 PEN | Refills: 0 | Status: SHIPPED | OUTPATIENT
Start: 2020-01-16 | End: 2021-11-22

## 2020-01-16 RX ORDER — INSULIN LISPRO 100 [IU]/ML
0-18 INJECTION, SOLUTION INTRAVENOUS; SUBCUTANEOUS
Status: DISCONTINUED | OUTPATIENT
Start: 2020-01-16 | End: 2020-01-16 | Stop reason: HOSPADM

## 2020-01-16 RX ORDER — LANCETS 28 GAUGE
1 EACH MISCELLANEOUS DAILY
Qty: 100 EACH | Refills: 3 | Status: SHIPPED | OUTPATIENT
Start: 2020-01-16 | End: 2021-11-22

## 2020-01-16 RX ORDER — GLIMEPIRIDE 2 MG/1
4 TABLET ORAL
Status: CANCELLED | OUTPATIENT
Start: 2020-01-17

## 2020-01-16 RX ADMIN — IPRATROPIUM BROMIDE AND ALBUTEROL SULFATE 1 AMPULE: .5; 3 SOLUTION RESPIRATORY (INHALATION) at 02:29

## 2020-01-16 RX ADMIN — SODIUM CHLORIDE: 9 INJECTION, SOLUTION INTRAVENOUS at 00:54

## 2020-01-16 RX ADMIN — INSULIN LISPRO 12 UNITS: 100 INJECTION, SOLUTION INTRAVENOUS; SUBCUTANEOUS at 08:42

## 2020-01-16 RX ADMIN — Medication 10 ML: at 10:03

## 2020-01-16 RX ADMIN — INSULIN GLARGINE 17 UNITS: 100 INJECTION, SOLUTION SUBCUTANEOUS at 00:47

## 2020-01-16 RX ADMIN — IPRATROPIUM BROMIDE AND ALBUTEROL SULFATE 1 AMPULE: .5; 3 SOLUTION RESPIRATORY (INHALATION) at 14:48

## 2020-01-16 RX ADMIN — FAMOTIDINE 20 MG: 20 TABLET ORAL at 00:33

## 2020-01-16 RX ADMIN — ENOXAPARIN SODIUM 40 MG: 40 INJECTION SUBCUTANEOUS at 08:41

## 2020-01-16 RX ADMIN — INSULIN LISPRO 5 UNITS: 100 INJECTION, SOLUTION INTRAVENOUS; SUBCUTANEOUS at 00:47

## 2020-01-16 RX ADMIN — FAMOTIDINE 20 MG: 20 TABLET ORAL at 08:42

## 2020-01-16 RX ADMIN — INSULIN LISPRO 15 UNITS: 100 INJECTION, SOLUTION INTRAVENOUS; SUBCUTANEOUS at 12:05

## 2020-01-16 ASSESSMENT — PAIN SCALES - GENERAL
PAINLEVEL_OUTOF10: 0

## 2020-01-16 NOTE — ED NOTES
Report given to [facility] on 3A. V/u, denies questions at this time. Tele monitor in place with visual on monitors. HR 97 and in NSR. Pt taken to the floor by Kirill Vivar and belongings in tow. Pt a&o with no signs of distress. Breathing easy and unlabored. Family with pt.          Lavelle Milan, JD  01/15/20 7449

## 2020-01-16 NOTE — CARE COORDINATION
Patient admitted as Observation/OPIB with an anticipated short hospitalization length of stay. Chart reviewed and it appears that patient has minimal needs for discharge at this time. Discussed with patients nurse and requested that case management be notified if discharge needs are identified. Pt is 100% financial assistance and will receive meds to beds on discharge, JD Pearce is aware    *Case management will continue to follow progress and update discharge plan as needed.     Hilda Gould RN, BSN  [phone]

## 2020-01-16 NOTE — DISCHARGE SUMMARY
Hospital Medicine Discharge Summary    Patient: Benjamine Hodgkins Back     Gender: female  : 1980   Age: 44 y.o. MRN: [id number]    Admitting Physician: Jamaal Jasso MD  Discharge Physician: Ann Peterson MD     Code Status: Full Code     Admit Date: 1/15/2020   Discharge Date:   2019    Disposition:  Home    Discharge Diagnoses: Active Hospital Problems    Diagnosis Date Noted    Syncope and collapse [R55] 01/15/2020    Essential hypertension [I10] 2019       Follow-up appointments:  A few days    Outpatient to do list: follow up with PCP ASAP    Condition at Discharge:  Stable    Hospital Course:   Juwan Beaulieu is a 44 y.o. female who presented with   Has been feeling sick for few days now   Nausea & Diarrhea and Gen Body aches for few days now   Poor PO Intake X few days   Subjective fever X few days   Was seen at Urgent care couple of days ago and given a steroid injection and d/russ home   She has not been eating well and has not been taking her Home medications d/t poor PO Intake @ Home for few days now   Today she was at a Restaurant and  She had 1-2 episodes of near syncope and then 1 episode of syncope in the parking lot . Accompanied by her  who witnessed the episodes . Denies any chest pain . No palpitations . No Sz . No prior syncope noted   No h/o TIA/CVAs      Work up for SYNCOPE was negative. Likely a result of not eating and drinking well and loosing fluid through polyuria from uncontrolled diabetes. Uncontrolled diabetes  - partly due to being given steroids but, also not taking her metformin and noncompliance. Resume metformin, start high sliding scale. She needs immediate follow up with PCP and may need to see and endocrinologist.   I suggested stating another night to work on sugars but she wants to leave today.         Discharge Medications:   Current Discharge Medication List      START taking these medications    Details   !! insulin lispro, 1 Unit Dial, 100 UNIT/ML SOPN Inject 0-18 Units into the skin 3 times daily (with meals)  Qty: 1 pen, Refills: 1      !! insulin lispro, 1 Unit Dial, 100 UNIT/ML SOPN Inject 0-9 Units into the skin nightly  Qty: 1 pen, Refills: 0      SITagliptin (JANUVIA) 100 MG tablet Take 1 tablet by mouth daily  Qty: 30 tablet, Refills: 5      glucose monitoring kit (FREESTYLE) monitoring kit 1 kit by Does not apply route daily  Qty: 1 kit, Refills: 0      FREESTYLE LANCETS MISC 1 each by Does not apply route daily  Qty: 100 each, Refills: 3      blood glucose test strips (FREESTYLE TEST STRIPS) strip 1 each by In Vitro route daily As needed. Qty: 100 each, Refills: 3      Insulin Pen Needle 32G X 4 MM MISC 1 each by Does not apply route daily  Qty: 100 each, Refills: 3       !! - Potential duplicate medications found. Please discuss with provider. Current Discharge Medication List      CONTINUE these medications which have CHANGED    Details   metFORMIN (GLUCOPHAGE) 500 MG tablet Take 1 tablet by mouth 2 times daily (with meals)  Qty: 60 tablet, Refills: 3           Current Discharge Medication List      CONTINUE these medications which have NOT CHANGED    Details   metoprolol succinate (TOPROL XL) 100 MG extended release tablet Take 1 tablet by mouth daily  Qty: 30 tablet, Refills: 3    Associated Diagnoses: Essential hypertension      atorvastatin (LIPITOR) 10 MG tablet Take 10 mg by mouth      spironolactone (ALDACTONE) 25 MG tablet Take 1 tablet by mouth daily  Qty: 30 tablet, Refills: 1    Associated Diagnoses: Essential hypertension      furosemide (LASIX) 20 MG tablet Take 20 mg by mouth 2 times daily.            Current Discharge Medication List      STOP taking these medications       glimepiride (AMARYL) 1 MG tablet Comments:   Reason for Stopping:         labetalol (NORMODYNE) 200 MG tablet Comments:   Reason for Stopping:               Discharge ROS:  A complete review of systems was asked and negative except for bilaterally. Multiple complex thyroid nodules visualized within the medial aspect of the  right thyroid lobe near the isthmus. Signature   ------------------------------------------------------------------  Electronically signed by Kathya Jean MD, [address], [facility]  (Interpreting physician) on 01/16/2020 at 12:24 PM  ------------------------------------------------------------------  Patient Status:Routine. [address] [place] [address] Vascular Lab. Technical Quality:Adequate visualization. Velocities are measured in cm/s ; Diameters are measured in mm Carotid Right Measurements +---------------+----+----+-----+----+ ! Location       ! PSV ! EDV ! Angle! RI  ! +---------------+----+----+-----+----+ ! Prox CCA       !95. 8!39. 2! 60   !0.71! +---------------+----+----+-----+----+ ! Mid CCA        !96. 7!84. 8!60   !0.69! +---------------+----+----+-----+----+ ! Dist CCA       !118 !39. 2! 60   !0.67! +---------------+----+----+-----+----+ ! Prox ICA       !59. 3!29. 9!60   !0.5 ! +---------------+----+----+-----+----+ ! Mid ICA        !90.4!43. 4!60   !0.52! +---------------+----+----+-----+----+ ! Dist ICA       !84. 1!42. 7!60   !0.49! +---------------+----+----+-----+----+ ! Prox ECA       !87  !    !61   !    ! +---------------+----+----+-----+----+ ! Vertebral      !65.2!    !60   !    ! +---------------+----+----+-----+----+ ! Prox Subclavian! 130 !    !60   !    ! +---------------+----+----+-----+----+   - Additional Measurements:ICAPSV/CCAPSV 0.94. ICAEDV/CCAEDV 1.54. Carotid Left Measurements +---------------+----+----+-----+----+ ! Location       ! PSV ! EDV ! Angle! RI  ! +---------------+----+----+-----+----+ ! Prox CCA       !131 !40. 7! 60   !0.69! +---------------+----+----+-----+----+ ! Mid CCA        !134 !32  !60   !0.76! +---------------+----+----+-----+----+ ! Dist CCA       !107 !38. 7! 60   !0.64! +---------------+----+----+-----+----+ ! Prox ICA       !61. 5!22  !60   !0.64! +---------------+----+----+-----+----+ ! Mid ICA

## 2020-01-16 NOTE — PLAN OF CARE
Problem: Falls - Risk of:  Goal: Will remain free from falls  Description  Will remain free from falls  Outcome: Ongoing     Problem: Serum Glucose Level - Abnormal:  Goal: Ability to maintain appropriate glucose levels will improve  Description  Ability to maintain appropriate glucose levels will improve  Outcome: Ongoing  Note:   Admitted for syncope, hyperglycemia and hyponatremia- orthostatics daily, neuro checks q4h, I/O, IVF. Echo and Carotid dopplers ordered. SS insulin and Lantus ordered with POCT BS.

## 2020-01-16 NOTE — PROGRESS NOTES
Pt admitted to room 3321 from ED. VSS. Pt A/O. POC updated with pt, all questions answered. Oriented pt to room and call light. Call light and bedside table within reach. Instructed to call out with any needs, v/u. Will monitor.

## 2020-01-16 NOTE — H&P
_    [facility]    1/15/2020 10:30 PM    Patient Information:  Micheal Marr is a 44 y.o. female [id number]    PCP:  Germain Gomes MD (Tel: [phone] )    Date of Service:   Pt seen/examined on 1/15/2020   Placed in Observation. Chief complaint:    Chief Complaint   Patient presents with    Hyperglycemia     Pt was at a restaurant with lower abdominal pain, cramping, PT states when she got up, she felt dizzy and lightheaded. Transported via EMS. Pt reports diarrhea in the last few days       History of Present Illness:  Samantha Abdul is a 44 y.o. female who presented with   · Has been feeling sick for few days now   · Nausea & Diarrhea and Gen Body aches for few days now   · Poor PO Intake X few days   · Subjective fever X few days   · Was seen at [facility] care couple of days ago and given a steroid injection and d/russ home   · She has not been eating well and has not been taking her Home medications d/t poor PO Intake @ Home for few days now   · Today she was at a Restaurant and  She had 1-2 episodes of near syncope and then 1 episode of syncope in the parking lot . Accompanied by her  who witnessed the episodes . Denies any chest pain . No palpitations . No Sz .   · No prior syncope noted   · No h/o TIA/CVAs       History obtained from   · Patient  /  Family    · Prior chart  As well       So far, Notable/significant ED Findings :   · VSS        While in ED, patient care Given , Imagine done , and medications given/planned to be given per Current orders are  :   · IVF   · Diflucan   · Nebs   · Solumedrol X 1     · Admission Labs noted. Acute issues and mgt as below. · Pertinent Abnormal Labs as mentioned below.        Currently, on my evaluation, patient is :   · Since arrival, post above Rx, patient is AO   · No acute complaints now   · Feels better s/p IVF in ED     REVIEW OF SYSTEMS:     Constitutional:  Negative for fever, chills or night sweats  ENT:   Negative of Onset    Diabetes Mother     High Blood Pressure Mother     High Cholesterol Mother     Stroke Mother     Diabetes Father            Physical Exam:  /72   Pulse 96   Temp 97.7 °F (36.5 °C) (Oral)   Resp 28   Wt 148 lb (67.1 kg)   LMP 01/04/2020 (Exact Date)   SpO2 98%   BMI 29.89 kg/m²     General appearance: Appears Comfortable. Eyes:  Sclera clear, pupils equal  ENT:  Dry  mucus membranes, Trachea midline. Cardiovascular:  Regular rhythm, normal S1, S2. No murmur, gallop, rub. No edema in lower extremities   Respiratory:  Noted Dec AE B/ L . Gastrointestinal:  Abdomen soft,  non-tender,  not distended,  normal bowel sounds   Musculoskeletal:  No cyanosis in digits, neck supple  Neurology:  Cranial nerves grossly intact. Alert and oriented in time, place and person. No speech or motor deficits   Psychiatry:  Appropriate affect. Not agitated  Skin:  Warm, dry, normal turgor, no rash       Labs:     Recent Labs     01/15/20  2136   WBC 6.8   HGB 12.8   HCT 38.8   *     Recent Labs     01/15/20  2136   *   K 4.2   CL 89*   CO2 21   BUN 15   CREATININE 0.8   CALCIUM 8.2*     No results for input(s): AST, ALT, BILIDIR, BILITOT, ALKPHOS in the last 72 hours. No results for input(s): INR in the last 72 hours. Recent Labs     01/15/20  2136   TROPONINI <0.01         Urinalysis:      Lab Results   Component Value Date    NITRU Negative 01/15/2020    WBCUA 0-2 01/15/2020    BACTERIA 1+ 01/15/2020    RBCUA 10-20 01/15/2020    BLOODU MODERATE 01/15/2020    SPECGRAV >1.030 01/15/2020    GLUCOSEU >=1000 01/15/2020         Radiology:     CXR:   I have reviewed the CXR  No acute or concerning findings/pathology noted on review    EKG/Telemonitor :    I have reviewed the EKG  NSR   Sinus arrythmia     IMAGING :     XR CHEST STANDARD (2 VW)   Final Result   No acute process.                ASSESSMENT / PLAN     Active Hospital Problems    Diagnosis Date Noted    Syncope and collapse [R55]

## 2020-01-16 NOTE — ED PROVIDER NOTES
times daily. GLIMEPIRIDE (AMARYL) 1 MG TABLET    Take 1 mg by mouth    LABETALOL (NORMODYNE) 200 MG TABLET    Take 1 tablet by mouth every 12 hours for 30 days. METFORMIN (GLUCOPHAGE) 500 MG TABLET    Take 500 mg by mouth 2 times daily (with meals)    METOPROLOL SUCCINATE (TOPROL XL) 100 MG EXTENDED RELEASE TABLET    Take 1 tablet by mouth daily    SPIRONOLACTONE (ALDACTONE) 25 MG TABLET    Take 1 tablet by mouth daily       ALLERGIES     Penicillins and Sulfa antibiotics    FAMILY HISTORY       Family History   Problem Relation Age of Onset    Diabetes Mother     High Blood Pressure Mother     High Cholesterol Mother     Stroke Mother     Diabetes Father           SOCIAL HISTORY       Social History     Socioeconomic History    Marital status:      Spouse name: None    Number of children: None    Years of education: None    Highest education level: None   Occupational History    None   Social Needs    Financial resource strain: None    Food insecurity:     Worry: None     Inability: None    Transportation needs:     Medical: None     Non-medical: None   Tobacco Use    Smoking status: Never Smoker    Smokeless tobacco: Never Used   Substance and Sexual Activity    Alcohol use:  Yes    Drug use: No    Sexual activity: Not Currently   Lifestyle    Physical activity:     Days per week: None     Minutes per session: None    Stress: None   Relationships    Social connections:     Talks on phone: None     Gets together: None     Attends Rastafarian service: None     Active member of club or organization: None     Attends meetings of clubs or organizations: None     Relationship status: None    Intimate partner violence:     Fear of current or ex partner: None     Emotionally abused: None     Physically abused: None     Forced sexual activity: None   Other Topics Concern    None   Social History Narrative    ** Merged History Encounter **            SCREENINGS           PHYSICAL EXAM    (5+ Phone [phone] METABOLIC PANEL - Abnormal; Notable for the following components:    Sodium 123 (*)     Chloride 89 (*)     Glucose 418 (*)     Calcium 8.2 (*)     All other components within normal limits    Narrative:     Performed at:  [facility]  [address], [address]   Phone [phone]   MICROSCOPIC URINALYSIS - Abnormal; Notable for the following components:    RBC, UA 10-20 (*)     Bacteria, UA 1+ (*)     Yeast, UA Present (*)     All other components within normal limits    Narrative:     Performed at:  [facility]  [address], [address]   Phone [phone]   POCT GLUCOSE - Abnormal; Notable for the following components:    POC Glucose 392 (*)     All other components within normal limits    Narrative:     Performed at:  [facility] [address], [address]   Phone [phone]   HCG, SERUM, QUALITATIVE    Narrative:     Performed at:  [facility]  [address], [address]   Phone [phone]    Narrative:     Quynh Jean  [facility] [phone],  Rejected CBCWD BMP LIPAS TROP Name/Called to: Mihai Mays, 01/15/2020  20:09, by [facility]  Performed at:  [facility]  [address], [address]   Phone [phone]   LIPASE    Narrative:     Performed at:  [facility]  [address], [address]   Phone [phone]   TROPONIN    Narrative:     Performed at:  [facility] [address], [address]   Phone [phone]   POCT GLUCOSE   POCT GLUCOSE       All other labs were within normal range or not returned as of this dictation.     EMERGENCY DEPARTMENT COURSE and DIFFERENTIAL DIAGNOSIS/MDM:   Vitals:    Vitals:    01/15/20 1837 01/15/20 2030   BP: 133/71    Pulse: 73    Resp: 16 18   Temp: 97.7 °F (36.5 °C)    TempSrc: Oral    SpO2: 96% 96%   Weight: 148 lb (67.1 kg)        Medications   0.9 % sodium chloride bolus (1,000 mLs Intravenous New Bag 1/15/20 2141)   fluconazole (DIFLUCAN) tablet 150 mg (has no administration in time range)   methylPREDNISolone sodium (SOLU-MEDROL) injection 125 mg (125 mg Intravenous Given 1/15/20 2141)   ipratropium-albuterol (DUONEB) nebulizer solution 1 ampule (1 ampule Inhalation Given 1/15/20 2030)   albuterol (PROVENTIL) nebulizer solution 5 mg (5 mg Nebulization Given 1/15/20 2030)       MDM. Cardiac work-up, breathing treatments, steroids, IV fluids ordered. Patient does have a history of diabetes. Blood glucose level is elevated. Plan on repeating after IV fluids. Patient found to be hyperglycemic. She also has hyponatremia. Corrected sodium is 128. No significant anion gap. No ketones in urine. Doubt DKA. Given low sodium and hyperglycemia recommend admission. She does have some yeast in her urine so Diflucan ordered    Lung sounds are improved. Patient reports that she was recently placed on steroids by an urgent care for her breathing. This may be contributed to the hyperglycemia. Also of note she is supposed to be on metformin 1000 mg p.o. twice daily and she reports that she is not taking this for a couple of weeks. CONSULTS:  IP CONSULT TO HOSPITALIST    PROCEDURES:  Unless otherwise noted below, none     Procedures    FINAL IMPRESSION      1. Hyponatremia    2. Uncontrolled type 2 diabetes mellitus with hyperglycemia ([address]    3. Noncompliance with medications    4. Mild intermittent asthma with exacerbation    5. Syncope and collapse    6. Diarrhea, unspecified type    7. Yeast cystitis          DISPOSITION/PLAN   DISPOSITION Decision To Admit 01/15/2020 10:13:44 PM      PATIENT REFERRED TO:  No follow-up provider specified.     DISCHARGE MEDICATIONS:  New Prescriptions    No medications on file (Please note:  Portions of this note were completed with a voice recognition program. Efforts were made to edit the dictations but occasionally words and phrases are mis-transcribed.)    Form v2016. J.5-cn    Laina Vergara DO (electronically signed)  Emergency Medicine Provider              Patrick Márquez DO  01/15/20 6913

## 2020-01-16 NOTE — PROGRESS NOTES
Diabetes Father        SOCIAL HISTORY  Social History     Tobacco Use    Smoking status: Never Smoker    Smokeless tobacco: Never Used   Substance Use Topics    Alcohol use: Yes    Drug use: No       ALLERGIES  Allergies   Allergen Reactions    Penicillins Rash    Sulfa Antibiotics Rash       MEDICATIONS  No current facility-administered medications on file prior to encounter. Current Outpatient Medications on File Prior to Encounter   Medication Sig Dispense Refill    metoprolol succinate (TOPROL XL) 100 MG extended release tablet Take 1 tablet by mouth daily 30 tablet 3    atorvastatin (LIPITOR) 10 MG tablet Take 10 mg by mouth      spironolactone (ALDACTONE) 25 MG tablet Take 1 tablet by mouth daily 30 tablet 1    furosemide (LASIX) 20 MG tablet Take 20 mg by mouth 2 times daily.          Objective:     LABS    CBC:   Lab Results   Component Value Date    WBC 4.5 01/16/2020    RBC 4.56 01/16/2020    HGB 11.5 01/16/2020    HCT 34.6 01/16/2020    MCV 75.9 01/16/2020    MCH 25.3 01/16/2020    MCHC 33.3 01/16/2020    RDW 16.2 01/16/2020     01/16/2020    MPV 7.5 01/16/2020     CMP:    Lab Results   Component Value Date     01/16/2020    K 4.3 01/16/2020    CL 98 01/16/2020    CO2 18 01/16/2020    BUN 14 01/16/2020    CREATININE 0.7 01/16/2020    GFRAA >60 01/16/2020    GFRAA >60 06/10/2013    AGRATIO 0.3 01/16/2020    LABGLOM >60 01/16/2020    GLUCOSE 491 01/16/2020    PROT 4.9 01/16/2020    PROT 4.6 01/14/2013    LABALBU 1.1 01/16/2020    CALCIUM 7.6 01/16/2020    BILITOT <0.2 01/16/2020    ALKPHOS 72 01/16/2020    AST 14 01/16/2020    ALT 11 01/16/2020       HgBA1c:    Lab Results   Component Value Date    LABA1C 10.6 01/15/2020       This patient's last creatinine was   Recent Labs     01/16/20  0514   CREATININE 0.7        Recent Blood sugars have been   Lab Results   Component Value Date    POCGLU 387 01/16/2020    POCGLU 436 01/16/2020    POCGLU 358 01/15/2020    POCGLU 392 01/15/2020 POCGLU 123 01/15/2013    POCGLU 101 01/15/2013    POCGLU 108 01/15/2013    POCGLU 110 01/15/2013     Lab Results   Component Value Date    GLUCOSE 491 2020    GLUCOSE 418 01/15/2020    GLUCOSE 237 2019    GLUCOSE 252 2019    GLUCOSE 141 2018    GLUCOSE 165 2018            Assessment:     Patient Active Problem List   Diagnosis    Proteinuria complicating pregnancy    PIH (pregnancy induced hypertension), antepartum     delivery delivered    Proteinuria    Essential hypertension    Syncope and collapse       Plan:     Plan of Care:   Pt seen by financial counselor  Patient given new insulin start information. Continue to monitor blood sugars to determine adequacy of current dosages  Patient would like prescriptions on discharge to be filled here. Discharge Plan:  Pt to go home on orals and SSI per MD.  Pt states she has follow up with PCP tomorrow. Instructed to log blood sugars and take blood sugar log to her f/u appointment.

## 2020-01-17 LAB
ESTIMATED AVERAGE GLUCOSE: 263.3 MG/DL
HBA1C MFR BLD: 10.8 %

## 2020-11-23 PROBLEM — R60.0 LOCALIZED EDEMA: Status: ACTIVE | Noted: 2020-11-23

## 2021-11-22 ENCOUNTER — HOSPITAL ENCOUNTER (EMERGENCY)
Age: 41
Discharge: HOME | End: 2021-11-22
Payer: COMMERCIAL

## 2021-11-22 ENCOUNTER — APPOINTMENT (OUTPATIENT)
Dept: CT IMAGING | Age: 41
End: 2021-11-22
Payer: COMMERCIAL

## 2021-11-22 ENCOUNTER — APPOINTMENT (OUTPATIENT)
Dept: GENERAL RADIOLOGY | Age: 41
End: 2021-11-22
Payer: COMMERCIAL

## 2021-11-22 VITALS
BODY MASS INDEX: 25.51 KG/M2 | HEIGHT: 59 IN | RESPIRATION RATE: 25 BRPM | WEIGHT: 126.54 LBS | SYSTOLIC BLOOD PRESSURE: 147 MMHG | TEMPERATURE: 98.9 F | HEART RATE: 102 BPM | DIASTOLIC BLOOD PRESSURE: 66 MMHG | OXYGEN SATURATION: 100 %

## 2021-11-22 DIAGNOSIS — N30.01 ACUTE CYSTITIS WITH HEMATURIA: ICD-10-CM

## 2021-11-22 DIAGNOSIS — R25.2 MUSCLE CRAMPING: ICD-10-CM

## 2021-11-22 DIAGNOSIS — E04.1 THYROID NODULE: ICD-10-CM

## 2021-11-22 DIAGNOSIS — E05.90 HYPERTHYROIDISM: Primary | ICD-10-CM

## 2021-11-22 DIAGNOSIS — E83.42 HYPOMAGNESEMIA: ICD-10-CM

## 2021-11-22 DIAGNOSIS — R00.0 TACHYCARDIA: ICD-10-CM

## 2021-11-22 LAB
A/G RATIO: 1.3 (ref 1.1–2.2)
ALBUMIN SERPL-MCNC: 4 G/DL (ref 3.4–5)
ALP BLD-CCNC: 75 U/L (ref 40–129)
ALT SERPL-CCNC: 49 U/L (ref 10–40)
ANION GAP SERPL CALCULATED.3IONS-SCNC: 12 MMOL/L (ref 3–16)
ANTI-THYROGLOB ABS: 18 IU/ML
AST SERPL-CCNC: 32 U/L (ref 15–37)
BASOPHILS ABSOLUTE: 0 K/UL (ref 0–0.2)
BASOPHILS RELATIVE PERCENT: 0.5 %
BILIRUB SERPL-MCNC: <0.2 MG/DL (ref 0–1)
BILIRUBIN URINE: NEGATIVE
BLOOD, URINE: NEGATIVE
BUN BLDV-MCNC: 10 MG/DL (ref 7–20)
C-REACTIVE PROTEIN: 6.4 MG/L (ref 0–5.1)
CALCIUM SERPL-MCNC: 9.7 MG/DL (ref 8.3–10.6)
CHLORIDE BLD-SCNC: 101 MMOL/L (ref 99–110)
CLARITY: ABNORMAL
CO2: 23 MMOL/L (ref 21–32)
COLOR: YELLOW
CREAT SERPL-MCNC: <0.5 MG/DL (ref 0.6–1.1)
D DIMER: 224 NG/ML DDU (ref 0–229)
EKG ATRIAL RATE: 119 BPM
EKG DIAGNOSIS: NORMAL
EKG P AXIS: 40 DEGREES
EKG P-R INTERVAL: 150 MS
EKG Q-T INTERVAL: 324 MS
EKG QRS DURATION: 80 MS
EKG QTC CALCULATION (BAZETT): 455 MS
EKG R AXIS: 35 DEGREES
EKG T AXIS: 11 DEGREES
EKG VENTRICULAR RATE: 119 BPM
EOSINOPHILS ABSOLUTE: 0.1 K/UL (ref 0–0.6)
EOSINOPHILS RELATIVE PERCENT: 1.8 %
EPITHELIAL CELLS, UA: 8 /HPF (ref 0–5)
GFR AFRICAN AMERICAN: >60
GFR NON-AFRICAN AMERICAN: >60
GLUCOSE BLD-MCNC: 260 MG/DL (ref 70–99)
GLUCOSE URINE: >=1000 MG/DL
HCG QUALITATIVE: NEGATIVE
HCT VFR BLD CALC: 36.2 % (ref 36–48)
HEMOGLOBIN: 11.7 G/DL (ref 12–16)
HYALINE CASTS: 5 /LPF (ref 0–8)
KETONES, URINE: ABNORMAL MG/DL
LEUKOCYTE ESTERASE, URINE: ABNORMAL
LIPASE: 60 U/L (ref 13–60)
LYMPHOCYTES ABSOLUTE: 2 K/UL (ref 1–5.1)
LYMPHOCYTES RELATIVE PERCENT: 39.5 %
MAGNESIUM: 1.6 MG/DL (ref 1.8–2.4)
MCH RBC QN AUTO: 23.7 PG (ref 26–34)
MCHC RBC AUTO-ENTMCNC: 32.4 G/DL (ref 31–36)
MCV RBC AUTO: 73.2 FL (ref 80–100)
MICROSCOPIC EXAMINATION: YES
MONOCYTES ABSOLUTE: 0.6 K/UL (ref 0–1.3)
MONOCYTES RELATIVE PERCENT: 12.3 %
NEUTROPHILS ABSOLUTE: 2.3 K/UL (ref 1.7–7.7)
NEUTROPHILS RELATIVE PERCENT: 45.9 %
NITRITE, URINE: NEGATIVE
PARATHYROID HORMONE INTACT: 21.5 PG/ML (ref 14–72)
PDW BLD-RTO: 15.3 % (ref 12.4–15.4)
PH UA: 5 (ref 5–8)
PLATELET # BLD: 361 K/UL (ref 135–450)
PMV BLD AUTO: 7.5 FL (ref 5–10.5)
POTASSIUM SERPL-SCNC: 4 MMOL/L (ref 3.5–5.1)
PRO-BNP: 41 PG/ML (ref 0–124)
PROTEIN UA: ABNORMAL MG/DL
RBC # BLD: 4.95 M/UL (ref 4–5.2)
RBC UA: 3 /HPF (ref 0–4)
SEDIMENTATION RATE, ERYTHROCYTE: 27 MM/HR (ref 0–20)
SODIUM BLD-SCNC: 136 MMOL/L (ref 136–145)
SPECIFIC GRAVITY UA: 1.02 (ref 1–1.03)
T3 FREE: 19.8 PG/ML (ref 2.3–4.2)
T4 FREE: 4.8 NG/DL (ref 0.9–1.8)
THYROID PEROXIDASE (TPO) ABS: 34 IU/ML
TOTAL CK: 39 U/L (ref 26–192)
TOTAL PROTEIN: 7.1 G/DL (ref 6.4–8.2)
TROPONIN: <0.01 NG/ML
TSH REFLEX: <0.01 UIU/ML (ref 0.27–4.2)
URINE REFLEX TO CULTURE: YES
URINE TYPE: ABNORMAL
UROBILINOGEN, URINE: 0.2 E.U./DL
WBC # BLD: 5 K/UL (ref 4–11)
WBC UA: 10 /HPF (ref 0–5)

## 2021-11-22 PROCEDURE — 36415 COLL VENOUS BLD VENIPUNCTURE: CPT

## 2021-11-22 PROCEDURE — 80053 COMPREHEN METABOLIC PANEL: CPT

## 2021-11-22 PROCEDURE — 93005 ELECTROCARDIOGRAM TRACING: CPT | Performed by: PHYSICIAN ASSISTANT

## 2021-11-22 PROCEDURE — 85025 COMPLETE CBC W/AUTO DIFF WBC: CPT

## 2021-11-22 PROCEDURE — 6370000000 HC RX 637 (ALT 250 FOR IP): Performed by: PHYSICIAN ASSISTANT

## 2021-11-22 PROCEDURE — 6360000002 HC RX W HCPCS: Performed by: PHYSICIAN ASSISTANT

## 2021-11-22 PROCEDURE — 84484 ASSAY OF TROPONIN QUANT: CPT

## 2021-11-22 PROCEDURE — 84703 CHORIONIC GONADOTROPIN ASSAY: CPT

## 2021-11-22 PROCEDURE — 93010 ELECTROCARDIOGRAM REPORT: CPT | Performed by: INTERNAL MEDICINE

## 2021-11-22 PROCEDURE — 85652 RBC SED RATE AUTOMATED: CPT

## 2021-11-22 PROCEDURE — 81001 URINALYSIS AUTO W/SCOPE: CPT

## 2021-11-22 PROCEDURE — 83880 ASSAY OF NATRIURETIC PEPTIDE: CPT

## 2021-11-22 PROCEDURE — 96366 THER/PROPH/DIAG IV INF ADDON: CPT

## 2021-11-22 PROCEDURE — 96365 THER/PROPH/DIAG IV INF INIT: CPT

## 2021-11-22 PROCEDURE — 71046 X-RAY EXAM CHEST 2 VIEWS: CPT

## 2021-11-22 PROCEDURE — 84443 ASSAY THYROID STIM HORMONE: CPT

## 2021-11-22 PROCEDURE — 84439 ASSAY OF FREE THYROXINE: CPT

## 2021-11-22 PROCEDURE — 83735 ASSAY OF MAGNESIUM: CPT

## 2021-11-22 PROCEDURE — 86800 THYROGLOBULIN ANTIBODY: CPT

## 2021-11-22 PROCEDURE — 74176 CT ABD & PELVIS W/O CONTRAST: CPT

## 2021-11-22 PROCEDURE — 87086 URINE CULTURE/COLONY COUNT: CPT

## 2021-11-22 PROCEDURE — 85379 FIBRIN DEGRADATION QUANT: CPT

## 2021-11-22 PROCEDURE — 86140 C-REACTIVE PROTEIN: CPT

## 2021-11-22 PROCEDURE — 99285 EMERGENCY DEPT VISIT HI MDM: CPT

## 2021-11-22 PROCEDURE — 83970 ASSAY OF PARATHORMONE: CPT

## 2021-11-22 PROCEDURE — 86376 MICROSOMAL ANTIBODY EACH: CPT

## 2021-11-22 PROCEDURE — 2580000003 HC RX 258: Performed by: PHYSICIAN ASSISTANT

## 2021-11-22 PROCEDURE — 84481 FREE ASSAY (FT-3): CPT

## 2021-11-22 PROCEDURE — 83690 ASSAY OF LIPASE: CPT

## 2021-11-22 PROCEDURE — 82550 ASSAY OF CK (CPK): CPT

## 2021-11-22 RX ORDER — UBIDECARENONE 30 MG
1 CAPSULE ORAL DAILY
COMMUNITY

## 2021-11-22 RX ORDER — M-VIT,TX,IRON,MINS/CALC/FOLIC 27MG-0.4MG
1 TABLET ORAL DAILY
COMMUNITY

## 2021-11-22 RX ORDER — CEFUROXIME AXETIL 250 MG/1
250 TABLET ORAL 2 TIMES DAILY
Qty: 14 TABLET | Refills: 0 | Status: SHIPPED | OUTPATIENT
Start: 2021-11-22 | End: 2021-11-29

## 2021-11-22 RX ORDER — SPIRONOLACTONE 25 MG/1
25 TABLET ORAL DAILY
COMMUNITY

## 2021-11-22 RX ORDER — ERGOCALCIFEROL 1.25 MG/1
50000 CAPSULE ORAL WEEKLY
COMMUNITY

## 2021-11-22 RX ORDER — METOPROLOL SUCCINATE 50 MG/1
50 TABLET, EXTENDED RELEASE ORAL DAILY
Qty: 30 TABLET | Refills: 0 | Status: SHIPPED | OUTPATIENT
Start: 2021-11-22

## 2021-11-22 RX ORDER — 0.9 % SODIUM CHLORIDE 0.9 %
1000 INTRAVENOUS SOLUTION INTRAVENOUS ONCE
Status: COMPLETED | OUTPATIENT
Start: 2021-11-22 | End: 2021-11-22

## 2021-11-22 RX ORDER — METOPROLOL SUCCINATE 50 MG/1
50 TABLET, EXTENDED RELEASE ORAL DAILY
Status: DISCONTINUED | OUTPATIENT
Start: 2021-11-22 | End: 2021-11-22 | Stop reason: HOSPADM

## 2021-11-22 RX ORDER — MAGNESIUM SULFATE IN WATER 40 MG/ML
2000 INJECTION, SOLUTION INTRAVENOUS ONCE
Status: COMPLETED | OUTPATIENT
Start: 2021-11-22 | End: 2021-11-22

## 2021-11-22 RX ORDER — INSULIN LISPRO 200 [IU]/ML
40 INJECTION, SOLUTION SUBCUTANEOUS
COMMUNITY

## 2021-11-22 RX ORDER — METHOCARBAMOL 500 MG/1
TABLET, FILM COATED ORAL
COMMUNITY
Start: 2021-11-19 | End: 2021-11-22

## 2021-11-22 RX ORDER — AMLODIPINE AND OLMESARTAN MEDOXOMIL 10; 40 MG/1; MG/1
TABLET ORAL
COMMUNITY
Start: 2021-11-19

## 2021-11-22 RX ORDER — INSULIN GLARGINE 100 [IU]/ML
20 INJECTION, SOLUTION SUBCUTANEOUS NIGHTLY
COMMUNITY

## 2021-11-22 RX ADMIN — SODIUM CHLORIDE 1000 ML: 9 INJECTION, SOLUTION INTRAVENOUS at 08:53

## 2021-11-22 RX ADMIN — MAGNESIUM SULFATE HEPTAHYDRATE 2000 MG: 2 INJECTION, SOLUTION INTRAVENOUS at 09:55

## 2021-11-22 RX ADMIN — METOPROLOL SUCCINATE 50 MG: 50 TABLET, EXTENDED RELEASE ORAL at 11:28

## 2021-11-22 ASSESSMENT — PAIN DESCRIPTION - LOCATION: LOCATION: ABDOMEN;LEG

## 2021-11-22 ASSESSMENT — PAIN SCALES - GENERAL: PAINLEVEL_OUTOF10: 5

## 2021-11-22 ASSESSMENT — PAIN DESCRIPTION - PAIN TYPE: TYPE: ACUTE PAIN

## 2021-11-22 NOTE — Clinical Note
Ilana Johnson was seen and treated in our emergency department on 11/22/2021. She may return to work on 11/24/2021. If you have any questions or concerns, please don't hesitate to call.       Jamar Mahmood PA-C

## 2021-11-22 NOTE — Clinical Note
Richard Johnson was seen and treated in our emergency department on 11/22/2021. She may return to work on 11/24/2021. If you have any questions or concerns, please don't hesitate to call.       Ange Thorpe PA-C

## 2021-11-22 NOTE — PROGRESS NOTES
Medication Reconciliation    List of medications for Oscar Johnson is currently taking is complete. Source of Information:   1. Epic records  2. Conversation with patient at bedside     Allergies   Allergy list reviewed, no new allergies added   Allergies listed in Epic as follows: Penicillins and Sulfa antibiotics     Current Medications:    Current Facility-Administered Medications:     magnesium sulfate 2000 mg in 50 mL IVPB premix, 2,000 mg, IntraVENous, Once, María Elena Amezcua PA-C    metoprolol succinate (TOPROL XL) extended release tablet 50 mg, 50 mg, Oral, Daily, María Elena Amezcua PA-C    Current Outpatient Medications:     amLODIPine-olmesartan (MARIELENA) 10-40 MG per tablet, , Disp: , Rfl:     insulin lispro (HUMALOG KWIKPEN) 200 UNIT/ML SOPN pen, Inject 40 Units into the skin 3 times daily (with meals), Disp: , Rfl:     insulin glargine (BASAGLAR KWIKPEN) 100 UNIT/ML injection pen, Inject 20 Units into the skin nightly, Disp: , Rfl:     spironolactone (ALDACTONE) 25 MG tablet, Take 25 mg by mouth daily, Disp: , Rfl:     vitamin D (ERGOCALCIFEROL) 1.25 MG (58440 UT) CAPS capsule, Take 50,000 Units by mouth once a week, Disp: , Rfl:     Multiple Vitamins-Minerals (THERAPEUTIC MULTIVITAMIN-MINERALS) tablet, Take 1 tablet by mouth daily, Disp: , Rfl:     Potassium Gluconate 550 MG TABS, Take 1 tablet by mouth daily, Disp: , Rfl:     glucose monitoring kit (FREESTYLE) monitoring kit, 1 kit by Does not apply route daily, Disp: 1 kit, Rfl: 0    FREESTYLE LANCETS MISC, 1 each by Does not apply route daily, Disp: 100 each, Rfl: 3    blood glucose test strips (FREESTYLE TEST STRIPS) strip, 1 each by In Vitro route daily As needed. , Disp: 100 each, Rfl: 3    Insulin Pen Needle 32G X 4 MM MISC, 1 each by Does not apply route daily, Disp: 100 each, Rfl: 3    Notes Regarding Home Medications:   1. Patient did not receive any of their home medications prior to arrival to the emergency department  2.  Patient is not adherent to her medications. States it has been about a week since she has used her Basaglar insulin. 3. Patient was unsure of exact dose of OTC potassium she used, but she took some since she was feeling bad. 4. States she no longer takes Metoprolol or a statin.      Darcy Tenorio, PharmD Candidate   11/22/2021 10:38 AM

## 2021-11-22 NOTE — Clinical Note
Yenifer Johnson was seen and treated in our emergency department on 11/22/2021. She may return to work on 11/24/2021. If you have any questions or concerns, please don't hesitate to call.       Angeli Jewell PA-C

## 2021-11-22 NOTE — ED PROVIDER NOTES
[address] [facility]        Pt Name: Demarcus Martinez  MRN: [id number]  [place] 1980  Date of evaluation: 11/22/2021  Provider: Casi Kaplan PA-C  PCP: Kiesha Watson MD  Note Started: 8:05 AM EST       ERENDIRA. I have evaluated this patient. My supervising physician was available for consultation. Tom Barr MD      CHIEF COMPLAINT       Chief Complaint   Patient presents with    Fatigue     states muscle weakness/cramping in the legs for 3 weeks. Ambulated to triage without difficutly, PCP gace muscle relaxer Friday that she states doesn't help    Abdominal Cramping     for about 1 month, went to urgent care and was checked for UTI       HISTORY OF PRESENT ILLNESS   (Location, Timing/Onset, Context/Setting, Quality, Duration, Modifying Factors, Severity, Associated Signs and Symptoms)  Note limiting factors. Chief Complaint: Fatigue, lower leg weakness, cramping, paresthesia, abdominal cramping. Demarcus Martinez is a 36 y.o. female who presents with the above complaint. Ongoing for least 3-4 weeks. She is seen in urgent care and PCP. No idea what may be causing the problem. She does describe back pain and anterior abdominal pain. This is all in the lower abdomen/pelvic area. She does not indicate any chest pain, shortness of breath, nausea, vomiting, diarrhea, constipation, urinary urgency for, frequency or dysuria. No vaginal discharge. Patient does acknowledge low back pain and pain down both legs described as a pain, crampy, weak, altered sensation at times/paresthesia. States that it comes and goes without predictability. Not disabling or severe. She indicates a bit more to the front of the abdomen as opposed to the L-spine area. LNMP November 3. I did look at the patient's chart. She was seen by PCP on November 19, 2021 or approximately 2 days ago. Pulse at the time 115.  Patient pulse upon arrival 113 walking back to the room. As she sits in the bed it settles to 114. Her BP is 148/84. Patient does report having commented the above to her PCP. Referred to endocrinology. Patient does have chronic kidney disease possibly related to diabetes. She is already as of age. She sees Dr. Davina Villafuerte. Patient medications include Lasix 40 mg twice daily, omeprazole 20 mg daily, Crestor 20 mg daily, Aldactone 50 mg daily, Marielena 10/40   amlodipine 10/olmesartan 40. Robaxin 500 every 8H as needed. Nursing Notes were all reviewed and agreed with or any disagreements were addressed in the HPI. REVIEW OF SYSTEMS    (2-9 systems for level 4, 10 or more for level 5)     Review of Systems    Positives and Pertinent negatives as per HPI. Except as noted above in the ROS, all other systems were reviewed and negative. PAST MEDICAL HISTORY     Past Medical History:   Diagnosis Date    Asthma     Chronic kidney disease     protein/ r/o if pregnacy related or not    Diabetes mellitus ([facility] [address]     gestational    HTN (hypertension)     started this week    Hypertension     Protein in urine     seen by nephrologist         SURGICAL HISTORY   History reviewed. No pertinent surgical history.       [address]       Discharge Medication List as of 11/22/2021 11:45 AM      CONTINUE these medications which have NOT CHANGED    Details   amLODIPine-olmesartan (MARIELENA) 10-40 MG per tablet Historical Med      insulin lispro (HUMALOG KWIKPEN) 200 UNIT/ML SOPN pen Inject 40 Units into the skin 3 times daily (with meals)Historical Med      insulin glargine (BASAGLAR KWIKPEN) 100 UNIT/ML injection pen Inject 20 Units into the skin nightlyHistorical Med      spironolactone (ALDACTONE) 25 MG tablet Take 25 mg by mouth dailyHistorical Med      vitamin D (ERGOCALCIFEROL) 1.25 MG (40896 UT) CAPS capsule Take 50,000 Units by mouth once a weekHistorical Med      Multiple Vitamins-Minerals (THERAPEUTIC MULTIVITAMIN-MINERALS) tablet Take 1 tablet by mouth dailyHistorical Med      Potassium Gluconate 550 MG TABS Take 1 tablet by mouth dailyHistorical Med               ALLERGIES     Penicillins and Sulfa antibiotics    FAMILYHISTORY       Family History   Problem Relation Age of Onset    Diabetes Mother     High Blood Pressure Mother     High Cholesterol Mother     Stroke Mother     Diabetes Father           SOCIAL HISTORY       Social History     Tobacco Use    Smoking status: Never Smoker    Smokeless tobacco: Never Used   Substance Use Topics    Alcohol use: Yes    Drug use: No       SCREENINGS    [place] Coma Scale  Eye Opening: Spontaneous  Best Verbal Response: Oriented  Best Motor Response: Obeys commands  Jose Coma Scale Score: 15        PHYSICAL EXAM    (up to 7 for level 4, 8 or more for level 5)     ED Triage Vitals [11/22/21 0729]   BP Temp Temp Source Pulse Resp SpO2 Height Weight   (!) 177/77 98.9 °F (37.2 °C) Temporal 130 18 100 % 4' 11\" (1.499 m) 126 lb 8.7 oz (57.4 kg)       Physical Exam  Vitals and nursing note reviewed. Constitutional:       Appearance: Normal appearance. She is well-developed and normal weight. HENT:      Head: Normocephalic and atraumatic. Right Ear: External ear normal.      Left Ear: External ear normal.   Eyes:      General: No scleral icterus. Right eye: No discharge. Left eye: No discharge. Conjunctiva/sclera: Conjunctivae normal.   Cardiovascular:      Rate and Rhythm: Regular rhythm. Tachycardia present. Heart sounds: Normal heart sounds. Pulmonary:      Effort: Pulmonary effort is normal.      Breath sounds: Normal breath sounds. Abdominal:      General: Abdomen is flat. Bowel sounds are normal.      Palpations: Abdomen is soft. Tenderness: There is no abdominal tenderness. Musculoskeletal:         General: Normal range of motion. Cervical back: Normal range of motion and neck supple. Right lower leg: No edema. Left lower leg: No edema.    Skin: General: Skin is warm and dry. Neurological:      General: No focal deficit present. Mental Status: She is alert and oriented to person, place, and time. Mental status is at baseline. Psychiatric:         Mood and Affect: Mood normal.         Behavior: Behavior normal.         Thought Content:  Thought content normal.         Judgment: Judgment normal.         DIAGNOSTIC RESULTS   LABS:    Labs Reviewed   CBC WITH AUTO DIFFERENTIAL - Abnormal; Notable for the following components:       Result Value    Hemoglobin 11.7 (*)     MCV 73.2 (*)     MCH 23.7 (*)     All other components within normal limits    Narrative:     Performed at:  [address] [employer]   Phone [phone]   COMPREHENSIVE METABOLIC PANEL - Abnormal; Notable for the following components:    Glucose 260 (*)     CREATININE <0.5 (*)     ALT 49 (*)     All other components within normal limits    Narrative:     Performed at:  [address] [employer]   Phone [phone]   MAGNESIUM - Abnormal; Notable for the following components:    Magnesium 1.60 (*)     All other components within normal limits    Narrative:     Performed at:  [address] [employer]   Phone [phone]   URINE RT REFLEX TO CULTURE - Abnormal; Notable for the following components:    Clarity, UA CLOUDY (*)     Glucose, Ur >=1000 (*)     Ketones, Urine TRACE (*)     Protein, UA TRACE (*)     Leukocyte Esterase, Urine SMALL (*)     All other components within normal limits    Narrative:     Performed at:  [address] [employer]   Phone [phone]   SEDIMENTATION RATE - Abnormal; Notable for the following components:    Sed Rate 27 (*)     All other components within normal limits    Narrative:     Performed at:  [address] [employer]   Phone [phone]   C-REACTIVE PROTEIN - Abnormal; Notable for the following components:    CRP 6.4 (*)     All other components within normal limits    Narrative:     Performed at:  [address] [employer] [address]   Phone [phone]   TSH WITH REFLEX - Abnormal; Notable for the following components:    TSH <0.01 (*)     All other components within normal limits    Narrative:     Performed at:  [address] [employer]   Phone [phone]   MICROSCOPIC URINALYSIS - Abnormal; Notable for the following components:    WBC, UA 10 (*)     Epithelial Cells, UA 8 (*)     All other components within normal limits    Narrative:     Performed at:  [address] [employer] [address]   Phone [phone]   T4, FREE - Abnormal; Notable for the following components:    T4 Free 4.8 (*)     All other components within normal limits    Narrative:     Performed at:  [address] [employer] [address]   Phone [phone]   T3, FREE - Abnormal; Notable for the following components:    T3, Free 19.8 (*)     All other components within normal limits    Narrative:     Performed at:  [address] [employer] [address]   Phone [phone]   THYROID PEROXIDASE ANTIBODY - Abnormal; Notable for the following components:    THYROID PEROXIDASE (TPO) ABS 34 (*)     All other components within normal limits    Narrative:     Performed at:  [address] [employer]   Phone [phone]   CULTURE, URINE   TROPONIN    Narrative:     Performed at:  [address] [place] [address] [facility]  [address] [place] [address]   Phone [phone]   LIPASE    Narrative:     Performed at:  [facility]  [address] [facility] [address] [place] [address]   Phone [phone]   BRAIN NATRIURETIC PEPTIDE    Narrative:     Performed at:  [facility]  [address] [facility] [address] [place] [address]   Phone [phone]   HCG, SERUM, QUALITATIVE    Narrative:     Performed at:  [facility]  [address] [facility] [address] [place] [address]   Phone [phone]   CK    Narrative:     Performed at:  [facility]  [address] [facility] [place] [address]   Phone [phone]   D-DIMER, QUANTITATIVE    Narrative:     Performed at:  [facility]  [address] [facility] [place] [address]   Phone [phone]   ANTI-THYROGLOBULIN ANTIBODY    Narrative:     Performed at:  [facility]  [address] [facility] [place] [address]   Phone [phone]   PTH, INTACT    Narrative:     Performed at:  [facility]  [address] [place] [place] [address]   Phone [phone]   THYROGLOBULIN       When ordered only abnormal lab results are displayed. All other labs were within normal range or not returned as of this dictation. EKG: When ordered, EKG's are interpreted by the Emergency Department Physician in the absence of a cardiologist.  Please see their note for interpretation of EKG.     RADIOLOGY:   Non-plain film images such as CT, Ultrasound and MRI are read by the radiologist. Plain radiographic images are visualized and preliminarily interpreted by the ED Provider with the below findings:        Interpretation per the Radiologist below, if available at the time of this note:    CT ABDOMEN PELVIS WO CONTRAST Additional Contrast? None   Preliminary Result   No evidence of urinary tract stones or hydronephrosis. No acute abnormality   in the abdomen or pelvis. XR CHEST (2 VW)   Final Result   No evidence of acute cardiopulmonary disease. No results found. PROCEDURES   Unless otherwise noted below, none     Procedures    CRITICAL CARE TIME   N/A    CONSULTS:  None      EMERGENCY DEPARTMENT COURSE and DIFFERENTIAL DIAGNOSIS/MDM:   Vitals:    Vitals:    11/22/21 0745 11/22/21 0845 11/22/21 0856 11/22/21 1115   BP: (!) 148/84  (!) 144/75 (!) 147/66   Pulse: 114   102   Resp:   (!) 103 25   Temp:       TempSrc:       SpO2:  100% 100% 100%   Weight:       Height:           Patient was given the following medications:  Medications   0.9 % sodium chloride bolus (0 mLs IntraVENous Stopped 11/22/21 1047)   magnesium sulfate 2000 mg in 50 mL IVPB premix (0 mg IntraVENous Stopped 11/22/21 1210)         At 9:30 AM reevaluated patient. The patient magnesium 1.6. I placed order for mag sulfate 2 g IVPB over 1 hour. The patient's CRP elevated 6.4 and sed rate elevated 27.0. I also find the patient's TSH less than 0.01 and free T4 4.8. Patient with known thyroid nodule as seen on ultrasound in the past.  I believe patient is hyperthyroid due to thyroid nodule. She had been on Toprol- mg. I did initiate Toprol-XL 50 mg p.o. given here in the ED. I placed order additional thyroid function studies. Patient will be referred to ENT as well as endocrinology. I do believe the hyperthyroidism on the hypomagnesemia likely relating to her complaint of paresthesia and weakness. Tachycardia also likely related. Will be discharged with diagnosis acquired rate is likely secondary to the thyroid nodule previously identified. In the ED the patient was given Toprol-XL 50 mg.  I will discharge to home with Toprol-XL 50 mg #30 time 0. Return for also both ENT and endocrinology for further evaluation of hyperthyroidism. Patient was hypomagnesemic at 1.6 and I did give her mag sulfate 2 g IVPB. Patient does have the potential of a UTI. I did prescribe Ceftin 250 mg twice daily x1 week. Patient is aware urine culture pending at this time. Patient does express understanding of her diagnosis and the treatment plan. FINAL IMPRESSION      1. Hyperthyroidism    2. Thyroid nodule    3. Tachycardia    4. Muscle cramping    5. Hypomagnesemia    6.  Acute cystitis with hematuria          DISPOSITION/PLAN   DISPOSITION        PATIENT REFERRED TO:  Angela Castro MD  [address] [address] [address]  [phone]    Schedule an appointment as soon as possible for a visit on 11/26/2021      [facility] Emergency Department  [address] [address]  [phone]  Go to   If symptoms worsen    Dev Owen MD  [address] [facility]/ Marcell Izaguirre  Endocrinology & Diabetes  [place] [facility]  [phone]    Schedule an appointment as soon as possible for a visit in 3 days      [address] [address]  [phone]    Schedule an appointment as soon as possible for a visit in 3 days        DISCHARGE MEDICATIONS:  Discharge Medication List as of 11/22/2021 11:45 AM      START taking these medications    Details   cefUROXime (CEFTIN) 250 MG tablet Take 1 tablet by mouth 2 times daily for 7 days, Disp-14 tablet, R-0Normal             DISCONTINUED MEDICATIONS:  Discharge Medication List as of 11/22/2021 11:45 AM      STOP taking these medications       methocarbamol (ROBAXIN) 500 MG tablet Comments:   Reason for Stopping:         vitamin D (CHOLECALCIFEROL) 25 MCG (1000 UT) TABS tablet Comments:   Reason for Stopping:         rosuvastatin (CRESTOR) 20 MG tablet Comments:   Reason for Stopping:         Calcium Carbonate-Vitamin D (OYSTER SHELL CALCIUM/D) 500-200 MG-UNIT TABS Comments:   Reason for Stopping:         omeprazole (PRILOSEC) 20 MG delayed release capsule Comments:   Reason for Stopping: furosemide (LASIX) 40 MG tablet Comments:   Reason for Stopping:         losartan (COZAAR) 25 MG tablet Comments:   Reason for Stopping:         insulin lispro, 1 Unit Dial, 100 UNIT/ML SOPN Comments:   Reason for Stopping:         insulin lispro, 1 Unit Dial, 100 UNIT/ML SOPN Comments:   Reason for Stopping:         glucose monitoring kit (FREESTYLE) monitoring kit Comments:   Reason for Stopping:         FREESTYLE LANCETS MISC Comments:   Reason for Stopping:         blood glucose test strips (FREESTYLE TEST STRIPS) strip Comments:   Reason for Stopping:         Insulin Pen Needle 32G X 4 MM MISC Comments:   Reason for Stopping:         atorvastatin (LIPITOR) 10 MG tablet Comments:   Reason for Stopping:                      (Please note that portions of this note were completed with a voice recognition program.  Efforts were made to edit the dictations but occasionally words are mis-transcribed. )    Rad Browne PA-C (electronically signed)    Halima Benson PA-C  11/22/21 2361

## 2021-11-22 NOTE — ED NOTES
Discharge education complete. Patient educated on new medications, follow up care and reasons to seek medical attention. Patient denies questions or concerns, no sxs of distress noted , patient declined wheel chair at time of discharge.         Cydney Alvarez RN  11/22/21 3250

## 2021-11-22 NOTE — Clinical Note
Chris Johnson was seen and treated in our emergency department on 11/22/2021. She may return to work on 11/24/2021. If you have any questions or concerns, please don't hesitate to call.       Eloisa Brian PA-C

## 2021-11-22 NOTE — ED TRIAGE NOTES
Patient reports muscle pain, chest pain and intermittent lower abd pain x 1 month, denies chest pain currently, rates muscle pain at 5, reports taking 1000 mg Tylenol at 0300 today. Ambulatory on arrival, EKG in process, no sxs of distress noted.

## 2021-11-22 NOTE — ED PROVIDER NOTES
I was available for consultation during patient's ED stay. Patient was cared for by ERENDIRA. I did not evaluate or participate in patient care.     EKG Interpretation    Interpreted by emergency department physician    Rhythm: sinus tachycardia  Rate: normal  Axis: normal  Ectopy: none  Conduction: normal  ST Segments: nonspecific changes  T Waves: non specific changes  Q Waves: none    Clinical Impression: non-specific EKG unchanged from January 2020    MD Eva Guido MD  11/22/21 2915

## 2021-11-23 LAB — URINE CULTURE, ROUTINE: NORMAL

## 2021-12-12 ENCOUNTER — APPOINTMENT (OUTPATIENT)
Dept: CT IMAGING | Age: 41
End: 2021-12-12
Payer: COMMERCIAL

## 2021-12-12 ENCOUNTER — HOSPITAL ENCOUNTER (EMERGENCY)
Age: 41
Discharge: HOME | End: 2021-12-12
Attending: EMERGENCY MEDICINE
Payer: COMMERCIAL

## 2021-12-12 VITALS
OXYGEN SATURATION: 100 % | SYSTOLIC BLOOD PRESSURE: 152 MMHG | HEART RATE: 110 BPM | RESPIRATION RATE: 20 BRPM | DIASTOLIC BLOOD PRESSURE: 60 MMHG | TEMPERATURE: 98.6 F

## 2021-12-12 DIAGNOSIS — E16.2 HYPOGLYCEMIA: ICD-10-CM

## 2021-12-12 DIAGNOSIS — R10.30 LOWER ABDOMINAL PAIN: Primary | ICD-10-CM

## 2021-12-12 LAB
ANION GAP SERPL CALCULATED.3IONS-SCNC: 13 MMOL/L (ref 3–16)
BASOPHILS ABSOLUTE: 0 K/UL (ref 0–0.2)
BASOPHILS RELATIVE PERCENT: 0.2 %
BILIRUBIN URINE: NEGATIVE
BLOOD, URINE: NEGATIVE
BUN BLDV-MCNC: 5 MG/DL (ref 7–20)
CALCIUM SERPL-MCNC: 9.6 MG/DL (ref 8.3–10.6)
CHLORIDE BLD-SCNC: 104 MMOL/L (ref 99–110)
CLARITY: ABNORMAL
CO2: 23 MMOL/L (ref 21–32)
COLOR: YELLOW
CREAT SERPL-MCNC: <0.5 MG/DL (ref 0.6–1.1)
EOSINOPHILS ABSOLUTE: 0 K/UL (ref 0–0.6)
EOSINOPHILS RELATIVE PERCENT: 0.1 %
EPITHELIAL CELLS, UA: 5 /HPF (ref 0–5)
GFR AFRICAN AMERICAN: >60
GFR NON-AFRICAN AMERICAN: >60
GLUCOSE BLD-MCNC: 144 MG/DL (ref 70–99)
GLUCOSE BLD-MCNC: 147 MG/DL (ref 70–99)
GLUCOSE BLD-MCNC: 183 MG/DL (ref 70–99)
GLUCOSE URINE: 500 MG/DL
HCG(URINE) PREGNANCY TEST: NEGATIVE
HCT VFR BLD CALC: 34.5 % (ref 36–48)
HEMOGLOBIN: 11 G/DL (ref 12–16)
HYALINE CASTS: 1 /LPF (ref 0–8)
KETONES, URINE: 15 MG/DL
LEUKOCYTE ESTERASE, URINE: NEGATIVE
LYMPHOCYTES ABSOLUTE: 1.1 K/UL (ref 1–5.1)
LYMPHOCYTES RELATIVE PERCENT: 20.9 %
MCH RBC QN AUTO: 23.4 PG (ref 26–34)
MCHC RBC AUTO-ENTMCNC: 31.8 G/DL (ref 31–36)
MCV RBC AUTO: 73.5 FL (ref 80–100)
MICROSCOPIC EXAMINATION: YES
MONOCYTES ABSOLUTE: 0.3 K/UL (ref 0–1.3)
MONOCYTES RELATIVE PERCENT: 5.7 %
NEUTROPHILS ABSOLUTE: 3.7 K/UL (ref 1.7–7.7)
NEUTROPHILS RELATIVE PERCENT: 73.1 %
NITRITE, URINE: NEGATIVE
PDW BLD-RTO: 15.4 % (ref 12.4–15.4)
PERFORMED ON: ABNORMAL
PERFORMED ON: ABNORMAL
PH UA: 6.5 (ref 5–8)
PLATELET # BLD: 365 K/UL (ref 135–450)
PMV BLD AUTO: 7 FL (ref 5–10.5)
POTASSIUM REFLEX MAGNESIUM: 4.6 MMOL/L (ref 3.5–5.1)
PROTEIN UA: 30 MG/DL
RBC # BLD: 4.69 M/UL (ref 4–5.2)
RBC UA: 3 /HPF (ref 0–4)
SODIUM BLD-SCNC: 140 MMOL/L (ref 136–145)
SPECIFIC GRAVITY UA: 1.02 (ref 1–1.03)
URINE REFLEX TO CULTURE: ABNORMAL
URINE TYPE: ABNORMAL
UROBILINOGEN, URINE: 0.2 E.U./DL
WBC # BLD: 5.1 K/UL (ref 4–11)
WBC UA: 1 /HPF (ref 0–5)

## 2021-12-12 PROCEDURE — 80048 BASIC METABOLIC PNL TOTAL CA: CPT

## 2021-12-12 PROCEDURE — 85025 COMPLETE CBC W/AUTO DIFF WBC: CPT

## 2021-12-12 PROCEDURE — 99285 EMERGENCY DEPT VISIT HI MDM: CPT

## 2021-12-12 PROCEDURE — 81001 URINALYSIS AUTO W/SCOPE: CPT

## 2021-12-12 PROCEDURE — 74176 CT ABD & PELVIS W/O CONTRAST: CPT

## 2021-12-12 PROCEDURE — 84703 CHORIONIC GONADOTROPIN ASSAY: CPT

## 2021-12-12 PROCEDURE — 6360000002 HC RX W HCPCS: Performed by: EMERGENCY MEDICINE

## 2021-12-12 PROCEDURE — 6370000000 HC RX 637 (ALT 250 FOR IP): Performed by: EMERGENCY MEDICINE

## 2021-12-12 PROCEDURE — 96374 THER/PROPH/DIAG INJ IV PUSH: CPT

## 2021-12-12 PROCEDURE — 2580000003 HC RX 258: Performed by: EMERGENCY MEDICINE

## 2021-12-12 PROCEDURE — 36415 COLL VENOUS BLD VENIPUNCTURE: CPT

## 2021-12-12 RX ORDER — KETOROLAC TROMETHAMINE 30 MG/ML
30 INJECTION, SOLUTION INTRAMUSCULAR; INTRAVENOUS ONCE
Status: COMPLETED | OUTPATIENT
Start: 2021-12-12 | End: 2021-12-12

## 2021-12-12 RX ORDER — 0.9 % SODIUM CHLORIDE 0.9 %
1000 INTRAVENOUS SOLUTION INTRAVENOUS ONCE
Status: COMPLETED | OUTPATIENT
Start: 2021-12-12 | End: 2021-12-12

## 2021-12-12 RX ORDER — OXYCODONE HYDROCHLORIDE AND ACETAMINOPHEN 5; 325 MG/1; MG/1
1 TABLET ORAL EVERY 6 HOURS PRN
Qty: 12 TABLET | Refills: 0 | Status: SHIPPED | OUTPATIENT
Start: 2021-12-12 | End: 2021-12-15

## 2021-12-12 RX ORDER — OXYCODONE HYDROCHLORIDE AND ACETAMINOPHEN 5; 325 MG/1; MG/1
1 TABLET ORAL ONCE
Status: COMPLETED | OUTPATIENT
Start: 2021-12-12 | End: 2021-12-12

## 2021-12-12 RX ORDER — AMLODIPINE BESYLATE 5 MG/1
10 TABLET ORAL DAILY
Status: DISCONTINUED | OUTPATIENT
Start: 2021-12-12 | End: 2021-12-12 | Stop reason: HOSPADM

## 2021-12-12 RX ADMIN — OXYCODONE AND ACETAMINOPHEN 1 TABLET: 5; 325 TABLET ORAL at 14:25

## 2021-12-12 RX ADMIN — KETOROLAC TROMETHAMINE 30 MG: 30 INJECTION, SOLUTION INTRAMUSCULAR; INTRAVENOUS at 13:25

## 2021-12-12 RX ADMIN — AMLODIPINE BESYLATE 10 MG: 5 TABLET ORAL at 12:22

## 2021-12-12 RX ADMIN — SODIUM CHLORIDE 1000 ML: 9 INJECTION, SOLUTION INTRAVENOUS at 13:21

## 2021-12-12 ASSESSMENT — PAIN DESCRIPTION - ONSET: ONSET: SUDDEN

## 2021-12-12 ASSESSMENT — PAIN DESCRIPTION - ORIENTATION: ORIENTATION: LOWER;MID

## 2021-12-12 ASSESSMENT — PAIN DESCRIPTION - PAIN TYPE: TYPE: ACUTE PAIN

## 2021-12-12 ASSESSMENT — PAIN SCALES - GENERAL
PAINLEVEL_OUTOF10: 7
PAINLEVEL_OUTOF10: 8

## 2021-12-12 ASSESSMENT — PAIN DESCRIPTION - DESCRIPTORS: DESCRIPTORS: SHARP;CRAMPING

## 2021-12-12 ASSESSMENT — PAIN DESCRIPTION - FREQUENCY: FREQUENCY: CONTINUOUS

## 2021-12-12 ASSESSMENT — PAIN DESCRIPTION - LOCATION: LOCATION: ABDOMEN

## 2021-12-12 NOTE — ED PROVIDER NOTES
eMERGENCY dEPARTMENT eNCOUnter      Pt Name: Senora Dakin  MRN: [id number]  [place] 1980  Date of evaluation: 12/12/2021  Provider: Jean Orantes MD     [address] [place] [address]       Chief Complaint   Patient presents with    Hypoglycemia     EMS give one oral glucose and one amp of D50. Initial blood glucose 46. HISTORY OF PRESENT ILLNESS   (Location/Symptom, Timing/Onset,Context/Setting, Quality, Duration, Modifying Factors, Severity) Note limiting factors. HPI    Senora Dakin is a 39 y.o. female who presents to the emergency department hypoglycemia. Patient has history of diabetes on insulin presents with a hypoglycemic episode at home. Patient was given D50 when her blood sugar was 46 on arrival was over 140. Patient alert and awake not having any issues. Patient has no fevers no chills has been seen in the past for this. Has woken up this morning gave her extra dose of insulin which have caused her to drop. Patient did not take her morning dose. There is no distress at this time was feeling good we gave her a sandwich but did not eat because she started having some abdominal cramping. Nursing Notes were reviewed. REVIEW OFSYSTEMS    (2+ for level 4; 10+ for level 5)   Review of Systems    General: No fevers, chills or night sweats, No weight loss    Head:  No Sore throat,  No Ear Pain    Chest:  Nontender. No Cough, No SOB,  Chest Pain    GI: Positive abdominal pain without vomiting    : No dysuria or hematuria    Musculoskeletal: No unrelenting pain or night pain    Neurologic: No bowel or bladder incontinence, No saddle anesthesia, No leg weakness    All other systems reviewed and are negative.         PAST MEDICAL HISTORY     Past Medical History:   Diagnosis Date    Asthma     Chronic kidney disease     protein/ r/o if pregnacy related or not    Diabetes mellitus ([facility] [address]     gestational    HTN (hypertension)     started this week    Hypertension     Protein in urine     seen by nephrologist       SURGICAL HISTORY     History reviewed. No pertinent surgical history. CURRENT MEDICATIONS       Discharge Medication List as of 12/12/2021  2:52 PM      CONTINUE these medications which have NOT CHANGED    Details   amLODIPine-olmesartan (MARIELENA) 10-40 MG per tablet Historical Med      insulin lispro (HUMALOG KWIKPEN) 200 UNIT/ML SOPN pen Inject 40 Units into the skin 3 times daily (with meals)Historical Med      insulin glargine (BASAGLAR KWIKPEN) 100 UNIT/ML injection pen Inject 20 Units into the skin nightlyHistorical Med      spironolactone (ALDACTONE) 25 MG tablet Take 25 mg by mouth dailyHistorical Med      vitamin D (ERGOCALCIFEROL) 1.25 MG (59085 UT) CAPS capsule Take 50,000 Units by mouth once a weekHistorical Med      Multiple Vitamins-Minerals (THERAPEUTIC MULTIVITAMIN-MINERALS) tablet Take 1 tablet by mouth dailyHistorical Med      Potassium Gluconate 550 MG TABS Take 1 tablet by mouth dailyHistorical Med      metoprolol succinate (TOPROL XL) 50 MG extended release tablet Take 1 tablet by mouth daily, Disp-30 tablet, R-0Normal             ALLERGIES     Penicillins and Sulfa antibiotics    FAMILY HISTORY       Family History   Problem Relation Age of Onset    Diabetes Mother     High Blood Pressure Mother     High Cholesterol Mother     Stroke Mother     Diabetes Father         SOCIAL HISTORY       Social History     Socioeconomic History    Marital status:      Spouse name: None    Number of children: None    Years of education: None    Highest education level: None   Occupational History    None   Tobacco Use    Smoking status: Never Smoker    Smokeless tobacco: Never Used   Substance and Sexual Activity    Alcohol use:  Yes    Drug use: No    Sexual activity: Not Currently   Other Topics Concern    None   Social History Narrative    ** Merged History Encounter **          Social Determinants of Health     Financial Resource Strain:     Difficulty of Paying Living Expenses: Not on file   Food Insecurity:     Worried About [address] in the Last Year: Not on file    Messi of Food in the Last Year: Not on file   Transportation Needs:     Lack of Transportation (Medical): Not on file    Lack of Transportation (Non-Medical): Not on file   Physical Activity:     Days of Exercise per Week: Not on file    Minutes of Exercise per Session: Not on file   Stress:     Feeling of Stress : Not on file   Social Connections:     Frequency of Communication with Friends and Family: Not on file    Frequency of Social Gatherings with Friends and Family: Not on file    Attends Mu-ism Services: Not on file    Active Member of [address] [place] [address] or Organizations: Not on file    Attends Club or Organization Meetings: Not on file    Marital Status: Not on file   Intimate Partner Violence:     Fear of Current or Ex-Partner: Not on file    Emotionally Abused: Not on file    Physically Abused: Not on file    Sexually Abused: Not on file   Housing Stability:     Unable to Pay for Housing in the Last Year: Not on file    Number of [place] in the Last Year: Not on file    Unstable Housing in the Last Year: Not on file       SCREENINGS           PHYSICAL EXAM    (up to 7 for level 4, 8 or more for level 5)     ED Triage Vitals   BP Temp Temp src Pulse Resp SpO2 Height Weight   -- -- -- -- -- -- -- --       Physical Exam    General: Alert and awake ×3. Nontoxic appearance. Well-developed well-nourished  HEENT: Normocephalic atraumatic. Neck is supple. Airway intact. No adenopathy  Cardiac: Regular rate and rhythm with no murmurs rubs or gallops  Pulmonary: Lungs are clear in all lung fields. No wheezing. No Rales. Abdomen: Soft and nontender. Negative hepatosplenomegaly. Bowel sounds are active  Extremities: Moving all extremities. No calf tenderness. Peripheral pulses all intact  Skin: No skin lesions.   No rashes  Neurologic: Cranial nerves II through XII was grossly intact. Nonfocal neurological exam  Psychiatric: Patient is pleasant. Mood is appropriate. DIAGNOSTIC RESULTS     EKG (Per Emergency Physician):       RADIOLOGY (Per Emergency Physician): Interpretation per the Radiologist below, if available at the time of this note:  CT ABDOMEN PELVIS WO CONTRAST Additional Contrast? None    Result Date: 12/12/2021  EXAMINATION: CT OF THE ABDOMEN AND PELVIS WITHOUT CONTRAST 12/12/2021 1:32 pm TECHNIQUE: CT of the abdomen and pelvis was performed without the administration of intravenous contrast. Multiplanar reformatted images are provided for review. Dose modulation, iterative reconstruction, and/or weight based adjustment of the mA/kV was utilized to reduce the radiation dose to as low as reasonably achievable. COMPARISON: CT abdomen and pelvis dated 11/22/2021. HISTORY: ORDERING SYSTEM PROVIDED HISTORY: ABD Pain TECHNOLOGIST PROVIDED HISTORY: Reason for exam:->ABD Pain Additional Contrast?->None Decision Support Exception - unselect if not a suspected or confirmed emergency medical condition->Emergency Medical Condition (MA) Is the patient pregnant?->No FINDINGS: Evaluation of the solid organs is limited due to lack of IV contrast. CARDIOVASCULAR: The heart is normal in size. There is no pericardial effusion. The abdominal aorta is normal in course and caliber. LUNG BASES: There are no basilar consolidations or pleural effusions. HEPATOBILIARY: The liver is normal in size. There are no focal hepatic lesions. There is no biliary ductal dilatation. The gallbladder is unremarkable. SPLEEN: Unremarkable. PANCREAS: Unremarkable. ADRENAL GLANDS: Unremarkable. KIDNEYS: The kidneys are normal in size and contour. There is no hydronephrosis or nephrolithiasis. ABDOMINAL NODES: No adenopathy is appreciated. PELVIC ORGANS: The urinary bladder appears thickened which may be due to its partially collapsed state versus cystitis. The fibroid uterus noted. PERITONEUM/MESENTERY/BOWEL: The stomach is unremarkable. There is no bowel obstruction. There is no bowel wall thickening. The appendix is normal. BONES/SOFT TISSUES: There is no acute osseous or soft tissue abnormality. Thickened appearance of the urinary bladder wall which may be due to its partially collapsed state versus cystitis. Fibroid uterus.        ED BEDSIDE ULTRASOUND:   Performed by ED Physician - none    LABS:  Labs Reviewed   CBC WITH AUTO DIFFERENTIAL - Abnormal; Notable for the following components:       Result Value    Hemoglobin 11.0 (*)     Hematocrit 34.5 (*)     MCV 73.5 (*)     MCH 23.4 (*)     All other components within normal limits    Narrative:     Performed at:  [address] [facility]   Phone [phone]   BASIC METABOLIC PANEL W/ REFLEX TO MG FOR LOW K - Abnormal; Notable for the following components:    Glucose 183 (*)     BUN 5 (*)     CREATININE <0.5 (*)     All other components within normal limits    Narrative:     Performed at:  [address] [facility]   Phone [phone]   URINE RT REFLEX TO CULTURE - Abnormal; Notable for the following components:    Clarity, UA CLOUDY (*)     Glucose, Ur 500 (*)     Ketones, Urine 15 (*)     Protein, UA 30 (*)     All other components within normal limits    Narrative:     Performed at:  [address] [place] [address]   Phone [phone]   POCT GLUCOSE - Abnormal; Notable for the following components:    POC Glucose 144 (*)     All other components within normal limits    Narrative:     Performed at:  [address] [place] [address]   Phone [phone]   POCT GLUCOSE - Abnormal; Notable for the following components:    POC Glucose 147 (*)     All other components within normal limits Narrative:     Performed at:  [facility]  [address] [facility] [address]   Phone [phone]   PREGNANCY, URINE    Narrative:     Performed at:  [facility]  [address] [facility] [address]   Phone [phone]   MICROSCOPIC URINALYSIS    Narrative:     Performed at:  [facility]  [address] [facility] [address]   Phone [phone]        All other labs were within normal range or not returned as of this dictation. Procedures      EMERGENCY DEPARTMENT COURSE and DIFFERENTIAL DIAGNOSIS/MDM:   Vitals:    Vitals:    12/12/21 1230 12/12/21 1300 12/12/21 1433 12/12/21 1446   BP: (!) 191/81 (!) 169/62 (!) 153/62 (!) 152/60   Pulse:       Resp:       Temp:       TempSrc:       SpO2:           Medications   0.9 % sodium chloride bolus (0 mLs IntraVENous Stopped 12/12/21 1423)   ketorolac (TORADOL) injection 30 mg (30 mg IntraVENous Given 12/12/21 1325)   oxyCODONE-acetaminophen (PERCOCET) 5-325 MG per tablet 1 tablet (1 tablet Oral Given 12/12/21 1425)       MDM. Patient presents with hypoglycemia patient is on insulin apparently woke up this morning and gave extra dose of insulin which caused her hypoglycemic episode. Patient blood sugar was 46 on arrival after D50 is back to normal.  Patient during the ED evaluation having some abdominal cramps CT scan did not show anything acute. Patient placed on Percocet. Will follow up. Blood pressure is improved after medication was given. We manage her hypertension hyperglycemia it was life-threatening but she has been stable for discharge. REVAL:         CRITICAL CARE TIME   Total CriticalCare time was 20 minutes, excluding separately reportable procedures. There was a high probability of clinically significant/life threatening deterioration in the patient's condition which required my urgent intervention. CONSULTS:  None    PROCEDURES:  Unless otherwise noted below, none     [unfilled]    FINAL IMPRESSION      1. Lower abdominal pain    2. Hypoglycemia          DISPOSITION/PLAN   DISPOSITION Decision To Discharge 12/12/2021 02:47:39 PM      PATIENT REFERRED TO:  [address] [facility] referral  Please call [phone] for referral  Schedule an appointment as soon as possible for a visit in 1 week      Dimitrios Thurston MD  [address] [place] [address] [address]  [phone]    Schedule an appointment as soon as possible for a visit in 1 week  If symptoms worsen      DISCHARGE MEDICATIONS:  Discharge Medication List as of 12/12/2021  2:52 PM      START taking these medications    Details   oxyCODONE-acetaminophen (PERCOCET) 5-325 MG per tablet Take 1 tablet by mouth every 6 hours as needed for Pain for up to 3 days. Intended supply: 3 days. Take lowest dose possible to manage pain, Disp-12 tablet, R-0Print                (Please note:  Portions of this note were completed with a voice recognition program.Efforts were made to edit the dictations but occasionally words and phrases are mis-transcribed.)  Form v2016. J.5-cn    Mila TUBBS MD (electronically signed)  Emergency Medicine Provider        Rickie Church MD  12/13/21 2932

## 2021-12-12 NOTE — ED NOTES
Bed: -32  Expected date: 12/12/21  Expected time:   Means of arrival:   Comments:  Brenden Melvin, RN  12/12/21 3031

## 2021-12-12 NOTE — ED NOTES
When checking on patient, patient explains abdominal pain located in the lower middle quadrant. States it is a sharp pain, MD made aware. States it happens frequently after meals.       Marquis Carlie RN  12/12/21 8633

## 2021-12-12 NOTE — ED NOTES
D/C: Order noted for d/c. Pt confirmed d/c paperwork and RX have correct name. Discharge and education instructions reviewed with patient. Teach-back successful. Pt verbalized understanding and signed d/c papers. Pt denied questions at this time. No acute distress noted. Patient instructed to follow-up as noted - return to emergency department if symptoms worsen. Patient verbalized understanding. Discharged per EDMD with discharge instructions. Pt discharged to private vehicle. Patient stable upon departure. Thanked patient for choosing [facility] for care. Provider aware of patient pain at time of discharge.      Azael Coleman, JD  12/12/21 0694

## 2021-12-12 NOTE — ED NOTES
Patient has known hypertension, unable to take BP medications due to hyperglycemic event today.       Mario Pond RN  12/12/21 4996

## 2024-07-22 ENCOUNTER — OFFICE VISIT (OUTPATIENT)
Age: 44
End: 2024-07-22

## 2024-07-22 VITALS
HEART RATE: 101 BPM | WEIGHT: 120 LBS | DIASTOLIC BLOOD PRESSURE: 117 MMHG | OXYGEN SATURATION: 98 % | HEIGHT: 59 IN | TEMPERATURE: 98.2 F | RESPIRATION RATE: 16 BRPM | SYSTOLIC BLOOD PRESSURE: 218 MMHG | BODY MASS INDEX: 24.19 KG/M2

## 2024-07-22 DIAGNOSIS — I10 ELEVATED BLOOD PRESSURE READING IN OFFICE WITH DIAGNOSIS OF HYPERTENSION: ICD-10-CM

## 2024-07-22 DIAGNOSIS — N89.8 VAGINAL ITCHING: ICD-10-CM

## 2024-07-22 DIAGNOSIS — B96.89 BACTERIAL VAGINOSIS: Primary | ICD-10-CM

## 2024-07-22 DIAGNOSIS — B37.31 CANDIDIASIS OF VULVA AND VAGINA: ICD-10-CM

## 2024-07-22 DIAGNOSIS — N76.0 BACTERIAL VAGINOSIS: Primary | ICD-10-CM

## 2024-07-22 RX ORDER — METRONIDAZOLE 500 MG/1
500 TABLET ORAL 2 TIMES DAILY
Qty: 14 TABLET | Refills: 0 | Status: SHIPPED | OUTPATIENT
Start: 2024-07-22 | End: 2024-07-29

## 2024-07-22 RX ORDER — PROPRANOLOL HYDROCHLORIDE 20 MG/1
1 TABLET ORAL 4 TIMES DAILY
COMMUNITY
Start: 2022-08-22

## 2024-07-22 RX ORDER — FLUCONAZOLE 150 MG/1
150 TABLET ORAL SEE ADMIN INSTRUCTIONS
Qty: 2 TABLET | Refills: 0 | Status: SHIPPED | OUTPATIENT
Start: 2024-07-22

## 2024-07-22 ASSESSMENT — ENCOUNTER SYMPTOMS
COUGH: 0
VOMITING: 0
NAUSEA: 0
BACK PAIN: 0
DIARRHEA: 0
ABDOMINAL PAIN: 0

## 2024-07-22 NOTE — PROGRESS NOTES
symptoms.      Vaginal Itching  The patient's pertinent negatives include no pelvic pain or vaginal discharge. Pertinent negatives include no abdominal pain, back pain, chills, diarrhea, dysuria, fever, flank pain, frequency, hematuria, nausea, urgency or vomiting.     Vitals:    07/22/24 1755   BP: (!) 218/117   Pulse: (!) 101   Resp: 16   Temp: 98.2 °F (36.8 °C)   SpO2: 98%   Weight: 54.4 kg (120 lb)   Height: 1.499 m (4' 11\")       No results found for this visit on 07/22/24.      Review of Systems   Constitutional:  Negative for chills, fatigue and fever.   Respiratory:  Negative for cough.    Cardiovascular:  Negative for chest pain.   Gastrointestinal:  Negative for abdominal pain, diarrhea, nausea and vomiting.   Genitourinary:  Positive for vaginal pain. Negative for dysuria, flank pain, frequency, hematuria, pelvic pain, urgency and vaginal discharge.   Musculoskeletal:  Negative for back pain and neck pain.       Objective   Physical Exam  Constitutional:       Appearance: She is not ill-appearing or toxic-appearing.   Eyes:      Pupils: Pupils are equal, round, and reactive to light.   Cardiovascular:      Rate and Rhythm: Normal rate and regular rhythm.      Pulses: Normal pulses.      Heart sounds: Normal heart sounds.   Pulmonary:      Effort: Pulmonary effort is normal.      Breath sounds: Normal breath sounds.      Comments: Lung sounds clear throughout  Abdominal:      Palpations: Abdomen is soft.      Tenderness: There is no abdominal tenderness. There is no right CVA tenderness, left CVA tenderness, guarding or rebound.      Comments: No pain with palpation to abdomen, no rebound tenderness or guarding, no rigidity or peritoneal signs, no CVA tenderness   Musculoskeletal:         General: Normal range of motion.      Cervical back: Normal range of motion.   Skin:     General: Skin is warm and dry.          An electronic signature was used to authenticate this note.      Ramona Boland, APRN - CNP

## 2024-07-22 NOTE — PATIENT INSTRUCTIONS
New Prescriptions    FLUCONAZOLE (DIFLUCAN) 150 MG TABLET    Take 1 tablet by mouth See Admin Instructions Repeat dose after completion of the antibiotic.    METRONIDAZOLE (FLAGYL) 500 MG TABLET    Take 1 tablet by mouth 2 times daily for 7 days     Take the antibiotic as prescribed.  Take diflucan as directed.  We will call with the vaginal pathogen probe results.  Encourage rest and increase fluid intake.  Follow-up with your PCP as needed.  Return for severe/worsening symptoms.

## 2024-07-23 LAB
CANDIDA DNA VAG QL NAA+PROBE: NORMAL
G VAGINALIS DNA SPEC QL NAA+PROBE: NORMAL
T VAGINALIS DNA VAG QL NAA+PROBE: NORMAL

## 2024-08-17 ENCOUNTER — HOSPITAL ENCOUNTER (INPATIENT)
Age: 44
LOS: 1 days | Discharge: LEFT BEFORE DISCHARGE | DRG: 064 | End: 2024-08-18
Attending: EMERGENCY MEDICINE | Admitting: FAMILY MEDICINE
Payer: COMMERCIAL

## 2024-08-17 ENCOUNTER — APPOINTMENT (OUTPATIENT)
Dept: CT IMAGING | Age: 44
DRG: 064 | End: 2024-08-17
Payer: COMMERCIAL

## 2024-08-17 DIAGNOSIS — E87.6 HYPOKALEMIA: Primary | ICD-10-CM

## 2024-08-17 DIAGNOSIS — R94.31 ACUTE ELECTROCARDIOGRAM CHANGES: ICD-10-CM

## 2024-08-17 DIAGNOSIS — R55 SYNCOPE AND COLLAPSE: ICD-10-CM

## 2024-08-17 DIAGNOSIS — I63.9 CEREBROVASCULAR ACCIDENT (CVA), UNSPECIFIED MECHANISM (HCC): ICD-10-CM

## 2024-08-17 PROBLEM — R41.82 AMS (ALTERED MENTAL STATUS): Status: ACTIVE | Noted: 2024-08-17

## 2024-08-17 LAB
ALBUMIN SERPL-MCNC: 3.8 G/DL (ref 3.4–5)
ALBUMIN/GLOB SERPL: 1.3 {RATIO} (ref 1.1–2.2)
ALP SERPL-CCNC: 68 U/L (ref 40–129)
ALT SERPL-CCNC: 15 U/L (ref 10–40)
ANION GAP SERPL CALCULATED.3IONS-SCNC: 14 MMOL/L (ref 3–16)
AST SERPL-CCNC: 12 U/L (ref 15–37)
BACTERIA URNS QL MICRO: ABNORMAL /HPF
BASE EXCESS BLDV CALC-SCNC: 3.2 MMOL/L (ref -2–3)
BASOPHILS # BLD: 0.1 K/UL (ref 0–0.2)
BASOPHILS NFR BLD: 0.6 %
BILIRUB SERPL-MCNC: <0.2 MG/DL (ref 0–1)
BILIRUB UR QL STRIP.AUTO: NEGATIVE
BUN SERPL-MCNC: 9 MG/DL (ref 7–20)
CALCIUM SERPL-MCNC: 9.3 MG/DL (ref 8.3–10.6)
CHLORIDE SERPL-SCNC: 100 MMOL/L (ref 99–110)
CLARITY UR: CLEAR
CO2 BLDV-SCNC: 31 MMOL/L
CO2 SERPL-SCNC: 26 MMOL/L (ref 21–32)
COHGB MFR BLDV: 1.3 % (ref 0–1.5)
COLOR UR: YELLOW
CREAT SERPL-MCNC: 1 MG/DL (ref 0.6–1.1)
DEPRECATED RDW RBC AUTO: 16 % (ref 12.4–15.4)
DO-HGB MFR BLDV: 39.5 %
EOSINOPHIL # BLD: 0.2 K/UL (ref 0–0.6)
EOSINOPHIL NFR BLD: 1.9 %
EPI CELLS #/AREA URNS HPF: ABNORMAL /HPF (ref 0–5)
GFR SERPLBLD CREATININE-BSD FMLA CKD-EPI: 71 ML/MIN/{1.73_M2}
GLUCOSE BLD-MCNC: 132 MG/DL (ref 70–99)
GLUCOSE BLD-MCNC: 213 MG/DL (ref 70–99)
GLUCOSE SERPL-MCNC: 121 MG/DL (ref 70–99)
GLUCOSE UR STRIP.AUTO-MCNC: >=1000 MG/DL
HCG SERPL QL: NEGATIVE
HCO3 BLDV-SCNC: 29.6 MMOL/L (ref 24–28)
HCT VFR BLD AUTO: 34.6 % (ref 36–48)
HGB BLD-MCNC: 10.9 G/DL (ref 12–16)
HGB UR QL STRIP.AUTO: NEGATIVE
KETONES UR STRIP.AUTO-MCNC: NEGATIVE MG/DL
LACTATE BLDV-SCNC: 2.1 MMOL/L (ref 0.4–2)
LACTATE BLDV-SCNC: 2.5 MMOL/L (ref 0.4–2)
LACTATE BLDV-SCNC: 4 MMOL/L (ref 0.4–2)
LEUKOCYTE ESTERASE UR QL STRIP.AUTO: NEGATIVE
LIPASE SERPL-CCNC: 36 U/L (ref 13–60)
LYMPHOCYTES # BLD: 5.1 K/UL (ref 1–5.1)
LYMPHOCYTES NFR BLD: 42.7 %
MAGNESIUM SERPL-MCNC: 1.7 MG/DL (ref 1.8–2.4)
MCH RBC QN AUTO: 23.3 PG (ref 26–34)
MCHC RBC AUTO-ENTMCNC: 31.4 G/DL (ref 31–36)
MCV RBC AUTO: 74.2 FL (ref 80–100)
METHGB MFR BLDV: 0.6 % (ref 0–1.5)
MONOCYTES # BLD: 0.6 K/UL (ref 0–1.3)
MONOCYTES NFR BLD: 5 %
NEUTROPHILS # BLD: 6 K/UL (ref 1.7–7.7)
NEUTROPHILS NFR BLD: 49.8 %
NITRITE UR QL STRIP.AUTO: NEGATIVE
PCO2 BLDV: 52.9 MMHG (ref 41–51)
PERFORMED ON: ABNORMAL
PERFORMED ON: ABNORMAL
PH BLDV: 7.36 [PH] (ref 7.35–7.45)
PH UR STRIP.AUTO: 6 [PH] (ref 5–8)
PLATELET # BLD AUTO: 443 K/UL (ref 135–450)
PMV BLD AUTO: 8.5 FL (ref 5–10.5)
PO2 BLDV: 35.5 MMHG (ref 25–40)
POTASSIUM SERPL-SCNC: 2.5 MMOL/L (ref 3.5–5.1)
POTASSIUM SERPL-SCNC: 2.6 MMOL/L (ref 3.5–5.1)
PROT SERPL-MCNC: 6.7 G/DL (ref 6.4–8.2)
PROT UR STRIP.AUTO-MCNC: 100 MG/DL
RBC # BLD AUTO: 4.66 M/UL (ref 4–5.2)
RBC #/AREA URNS HPF: ABNORMAL /HPF (ref 0–4)
RENAL EPI CELLS #/AREA UR COMP ASSIST: ABNORMAL /HPF (ref 0–1)
SAO2 % BLDV: 60 %
SODIUM SERPL-SCNC: 140 MMOL/L (ref 136–145)
SP GR UR STRIP.AUTO: 1.02 (ref 1–1.03)
TROPONIN, HIGH SENSITIVITY: 12 NG/L (ref 0–14)
TROPONIN, HIGH SENSITIVITY: 14 NG/L (ref 0–14)
TROPONIN, HIGH SENSITIVITY: 9 NG/L (ref 0–14)
TSH SERPL DL<=0.005 MIU/L-ACNC: 1.11 UIU/ML (ref 0.27–4.2)
UA COMPLETE W REFLEX CULTURE PNL UR: ABNORMAL
UA DIPSTICK W REFLEX MICRO PNL UR: YES
URN SPEC COLLECT METH UR: ABNORMAL
UROBILINOGEN UR STRIP-ACNC: 0.2 E.U./DL
WBC # BLD AUTO: 12 K/UL (ref 4–11)
WBC #/AREA URNS HPF: ABNORMAL /HPF (ref 0–5)

## 2024-08-17 PROCEDURE — 6360000002 HC RX W HCPCS: Performed by: EMERGENCY MEDICINE

## 2024-08-17 PROCEDURE — 80053 COMPREHEN METABOLIC PANEL: CPT

## 2024-08-17 PROCEDURE — 36415 COLL VENOUS BLD VENIPUNCTURE: CPT

## 2024-08-17 PROCEDURE — 1200000000 HC SEMI PRIVATE

## 2024-08-17 PROCEDURE — 6360000004 HC RX CONTRAST MEDICATION: Performed by: FAMILY MEDICINE

## 2024-08-17 PROCEDURE — 6370000000 HC RX 637 (ALT 250 FOR IP): Performed by: EMERGENCY MEDICINE

## 2024-08-17 PROCEDURE — 6370000000 HC RX 637 (ALT 250 FOR IP): Performed by: FAMILY MEDICINE

## 2024-08-17 PROCEDURE — 83605 ASSAY OF LACTIC ACID: CPT

## 2024-08-17 PROCEDURE — 70498 CT ANGIOGRAPHY NECK: CPT

## 2024-08-17 PROCEDURE — 84703 CHORIONIC GONADOTROPIN ASSAY: CPT

## 2024-08-17 PROCEDURE — 74177 CT ABD & PELVIS W/CONTRAST: CPT

## 2024-08-17 PROCEDURE — 83735 ASSAY OF MAGNESIUM: CPT

## 2024-08-17 PROCEDURE — 84484 ASSAY OF TROPONIN QUANT: CPT

## 2024-08-17 PROCEDURE — 85025 COMPLETE CBC W/AUTO DIFF WBC: CPT

## 2024-08-17 PROCEDURE — 82803 BLOOD GASES ANY COMBINATION: CPT

## 2024-08-17 PROCEDURE — 84443 ASSAY THYROID STIM HORMONE: CPT

## 2024-08-17 PROCEDURE — 6360000002 HC RX W HCPCS: Performed by: FAMILY MEDICINE

## 2024-08-17 PROCEDURE — 6360000002 HC RX W HCPCS: Performed by: NURSE PRACTITIONER

## 2024-08-17 PROCEDURE — 83690 ASSAY OF LIPASE: CPT

## 2024-08-17 PROCEDURE — 93005 ELECTROCARDIOGRAM TRACING: CPT | Performed by: EMERGENCY MEDICINE

## 2024-08-17 PROCEDURE — 84132 ASSAY OF SERUM POTASSIUM: CPT

## 2024-08-17 PROCEDURE — 6360000004 HC RX CONTRAST MEDICATION: Performed by: EMERGENCY MEDICINE

## 2024-08-17 PROCEDURE — 2580000003 HC RX 258: Performed by: EMERGENCY MEDICINE

## 2024-08-17 PROCEDURE — 70450 CT HEAD/BRAIN W/O DYE: CPT

## 2024-08-17 PROCEDURE — 99285 EMERGENCY DEPT VISIT HI MDM: CPT

## 2024-08-17 PROCEDURE — 81001 URINALYSIS AUTO W/SCOPE: CPT

## 2024-08-17 PROCEDURE — 87040 BLOOD CULTURE FOR BACTERIA: CPT

## 2024-08-17 RX ORDER — IOPAMIDOL 755 MG/ML
75 INJECTION, SOLUTION INTRAVASCULAR
Status: COMPLETED | OUTPATIENT
Start: 2024-08-17 | End: 2024-08-17

## 2024-08-17 RX ORDER — GLUCAGON 1 MG/ML
1 KIT INJECTION PRN
Status: DISCONTINUED | OUTPATIENT
Start: 2024-08-17 | End: 2024-08-18 | Stop reason: HOSPADM

## 2024-08-17 RX ORDER — POLYETHYLENE GLYCOL 3350 17 G/17G
17 POWDER, FOR SOLUTION ORAL DAILY PRN
Status: DISCONTINUED | OUTPATIENT
Start: 2024-08-17 | End: 2024-08-18 | Stop reason: HOSPADM

## 2024-08-17 RX ORDER — MAGNESIUM SULFATE IN WATER 40 MG/ML
2000 INJECTION, SOLUTION INTRAVENOUS PRN
Status: DISCONTINUED | OUTPATIENT
Start: 2024-08-17 | End: 2024-08-18 | Stop reason: HOSPADM

## 2024-08-17 RX ORDER — ASPIRIN 81 MG/1
81 TABLET, CHEWABLE ORAL DAILY
Status: DISCONTINUED | OUTPATIENT
Start: 2024-08-17 | End: 2024-08-18 | Stop reason: HOSPADM

## 2024-08-17 RX ORDER — POTASSIUM CHLORIDE 1500 MG/1
40 TABLET, EXTENDED RELEASE ORAL PRN
Status: DISCONTINUED | OUTPATIENT
Start: 2024-08-17 | End: 2024-08-18 | Stop reason: HOSPADM

## 2024-08-17 RX ORDER — PROCHLORPERAZINE EDISYLATE 5 MG/ML
10 INJECTION INTRAMUSCULAR; INTRAVENOUS EVERY 6 HOURS PRN
Status: DISCONTINUED | OUTPATIENT
Start: 2024-08-17 | End: 2024-08-18 | Stop reason: HOSPADM

## 2024-08-17 RX ORDER — SODIUM CHLORIDE, SODIUM LACTATE, POTASSIUM CHLORIDE, AND CALCIUM CHLORIDE .6; .31; .03; .02 G/100ML; G/100ML; G/100ML; G/100ML
1000 INJECTION, SOLUTION INTRAVENOUS ONCE
Status: COMPLETED | OUTPATIENT
Start: 2024-08-17 | End: 2024-08-17

## 2024-08-17 RX ORDER — MAGNESIUM SULFATE IN WATER 40 MG/ML
2000 INJECTION, SOLUTION INTRAVENOUS ONCE
Status: COMPLETED | OUTPATIENT
Start: 2024-08-17 | End: 2024-08-17

## 2024-08-17 RX ORDER — INSULIN LISPRO 100 [IU]/ML
0-4 INJECTION, SOLUTION INTRAVENOUS; SUBCUTANEOUS NIGHTLY
Status: DISCONTINUED | OUTPATIENT
Start: 2024-08-17 | End: 2024-08-18

## 2024-08-17 RX ORDER — ACETAMINOPHEN 325 MG/1
650 TABLET ORAL EVERY 6 HOURS PRN
Status: DISCONTINUED | OUTPATIENT
Start: 2024-08-17 | End: 2024-08-18 | Stop reason: HOSPADM

## 2024-08-17 RX ORDER — LOSARTAN POTASSIUM 50 MG/1
100 TABLET ORAL DAILY
Status: DISCONTINUED | OUTPATIENT
Start: 2024-08-18 | End: 2024-08-18

## 2024-08-17 RX ORDER — ASPIRIN 300 MG/1
300 SUPPOSITORY RECTAL DAILY
Status: DISCONTINUED | OUTPATIENT
Start: 2024-08-17 | End: 2024-08-18 | Stop reason: HOSPADM

## 2024-08-17 RX ORDER — AMLODIPINE BESYLATE 10 MG/1
10 TABLET ORAL DAILY
Status: DISCONTINUED | OUTPATIENT
Start: 2024-08-18 | End: 2024-08-18

## 2024-08-17 RX ORDER — HYDRALAZINE HYDROCHLORIDE 20 MG/ML
5 INJECTION INTRAMUSCULAR; INTRAVENOUS EVERY 6 HOURS PRN
Status: DISCONTINUED | OUTPATIENT
Start: 2024-08-17 | End: 2024-08-18

## 2024-08-17 RX ORDER — DEXTROSE MONOHYDRATE 100 MG/ML
INJECTION, SOLUTION INTRAVENOUS CONTINUOUS PRN
Status: DISCONTINUED | OUTPATIENT
Start: 2024-08-17 | End: 2024-08-18 | Stop reason: HOSPADM

## 2024-08-17 RX ORDER — ATORVASTATIN CALCIUM 80 MG/1
80 TABLET, FILM COATED ORAL NIGHTLY
Status: DISCONTINUED | OUTPATIENT
Start: 2024-08-17 | End: 2024-08-18 | Stop reason: HOSPADM

## 2024-08-17 RX ORDER — POTASSIUM CHLORIDE 7.45 MG/ML
10 INJECTION INTRAVENOUS
Status: COMPLETED | OUTPATIENT
Start: 2024-08-17 | End: 2024-08-17

## 2024-08-17 RX ORDER — AMLODIPINE AND OLMESARTAN MEDOXOMIL 10; 40 MG/1; MG/1
1 TABLET ORAL DAILY
Status: DISCONTINUED | OUTPATIENT
Start: 2024-08-18 | End: 2024-08-17 | Stop reason: CLARIF

## 2024-08-17 RX ORDER — ENOXAPARIN SODIUM 100 MG/ML
40 INJECTION SUBCUTANEOUS DAILY
Status: DISCONTINUED | OUTPATIENT
Start: 2024-08-17 | End: 2024-08-18 | Stop reason: HOSPADM

## 2024-08-17 RX ORDER — POTASSIUM CHLORIDE 7.45 MG/ML
10 INJECTION INTRAVENOUS PRN
Status: DISCONTINUED | OUTPATIENT
Start: 2024-08-17 | End: 2024-08-18 | Stop reason: HOSPADM

## 2024-08-17 RX ORDER — PROPRANOLOL HYDROCHLORIDE 20 MG/1
20 TABLET ORAL 4 TIMES DAILY
Status: DISCONTINUED | OUTPATIENT
Start: 2024-08-17 | End: 2024-08-18

## 2024-08-17 RX ORDER — POTASSIUM CHLORIDE 1.5 G/1.58G
40 POWDER, FOR SOLUTION ORAL ONCE
Status: DISCONTINUED | OUTPATIENT
Start: 2024-08-17 | End: 2024-08-17

## 2024-08-17 RX ORDER — INSULIN LISPRO 100 [IU]/ML
0-4 INJECTION, SOLUTION INTRAVENOUS; SUBCUTANEOUS
Status: DISCONTINUED | OUTPATIENT
Start: 2024-08-18 | End: 2024-08-18

## 2024-08-17 RX ORDER — ACETAMINOPHEN 650 MG/1
650 SUPPOSITORY RECTAL EVERY 6 HOURS PRN
Status: DISCONTINUED | OUTPATIENT
Start: 2024-08-17 | End: 2024-08-18 | Stop reason: HOSPADM

## 2024-08-17 RX ADMIN — POTASSIUM BICARBONATE 40 MEQ: 782 TABLET, EFFERVESCENT ORAL at 17:39

## 2024-08-17 RX ADMIN — IOPAMIDOL 75 ML: 755 INJECTION, SOLUTION INTRAVENOUS at 17:25

## 2024-08-17 RX ADMIN — HYDRALAZINE HYDROCHLORIDE 5 MG: 20 INJECTION, SOLUTION INTRAMUSCULAR; INTRAVENOUS at 18:53

## 2024-08-17 RX ADMIN — POTASSIUM CHLORIDE 10 MEQ: 10 INJECTION, SOLUTION INTRAVENOUS at 20:26

## 2024-08-17 RX ADMIN — POTASSIUM CHLORIDE 10 MEQ: 10 INJECTION, SOLUTION INTRAVENOUS at 17:38

## 2024-08-17 RX ADMIN — PROPRANOLOL HYDROCHLORIDE 20 MG: 20 TABLET ORAL at 22:04

## 2024-08-17 RX ADMIN — POTASSIUM CHLORIDE 10 MEQ: 10 INJECTION, SOLUTION INTRAVENOUS at 22:10

## 2024-08-17 RX ADMIN — SODIUM CHLORIDE, SODIUM LACTATE, POTASSIUM CHLORIDE, AND CALCIUM CHLORIDE 1000 ML: .6; .31; .03; .02 INJECTION, SOLUTION INTRAVENOUS at 15:20

## 2024-08-17 RX ADMIN — PROCHLORPERAZINE EDISYLATE 10 MG: 5 INJECTION INTRAMUSCULAR; INTRAVENOUS at 23:08

## 2024-08-17 RX ADMIN — ENOXAPARIN SODIUM 40 MG: 100 INJECTION SUBCUTANEOUS at 18:56

## 2024-08-17 RX ADMIN — ASPIRIN 81 MG: 81 TABLET, CHEWABLE ORAL at 18:53

## 2024-08-17 RX ADMIN — POTASSIUM CHLORIDE 10 MEQ: 10 INJECTION, SOLUTION INTRAVENOUS at 18:58

## 2024-08-17 RX ADMIN — MAGNESIUM SULFATE HEPTAHYDRATE 2000 MG: 40 INJECTION, SOLUTION INTRAVENOUS at 17:39

## 2024-08-17 RX ADMIN — IOPAMIDOL 75 ML: 755 INJECTION, SOLUTION INTRAVENOUS at 15:53

## 2024-08-17 RX ADMIN — ATORVASTATIN CALCIUM 80 MG: 80 TABLET, FILM COATED ORAL at 22:04

## 2024-08-17 ASSESSMENT — LIFESTYLE VARIABLES
HOW OFTEN DO YOU HAVE A DRINK CONTAINING ALCOHOL: MONTHLY OR LESS
HOW MANY STANDARD DRINKS CONTAINING ALCOHOL DO YOU HAVE ON A TYPICAL DAY: 1 OR 2

## 2024-08-17 NOTE — PLAN OF CARE
Problem: Discharge Planning  Goal: Discharge to home or other facility with appropriate resources  8/17/2024 1947 by Kelsie Edgar, RN  Outcome: Progressing  Flowsheets (Taken 8/17/2024 1947)  Discharge to home or other facility with appropriate resources: Identify barriers to discharge with patient and caregiver  8/17/2024 1947 by Kelsie Edgar, RN  Outcome: Progressing  Flowsheets (Taken 8/17/2024 1947)  Discharge to home or other facility with appropriate resources: Identify barriers to discharge with patient and caregiver

## 2024-08-17 NOTE — ED PROVIDER NOTES
THE [facility]  EMERGENCY DEPARTMENT ENCOUNTER          ATTENDING PHYSICIAN NOTE       Date of evaluation: 8/17/2024    Chief Complaint     Loss of Consciousness (LOC at son's football game. EMS gave oral glucose pt is type 2 diabetic. POC glucose on arrival to )      History of Present Illness     Astrid Johnson is a 43 y.o. female with history of insulin-dependent diabetes who presents emergency department today for a syncopal episode.  Per EMS report, she was at a local football game and was seated in a chair but was found unresponsive by her significant other after he returned from helping the football team.  He was concerned her glucose was low and gave her a small glucose tablet, but her sugar was not able to be checked at that time.  She was reportedly unconscious for approximately 25 minutes after she was found up until around the time of EMS arrival.  She had a glucose in the 150s for EMS and was gradually awakening and becoming more responsive upon transition to the EMS stretcher and ambulance.  She now notes significant bilateral lower abdominal pain, denies any recent falls or injuries that she is aware of.  Does not remember the invent, was not incontinent, no tongue injury.  Has had prior episodes of hypoglycemia where she has been confused but never unconscious for a significant period of time.  No recent infectious symptoms such as cough, congestion, nausea/vomiting, diarrhea, or dysuria.  States she did take her insulin shortly prior to going to the football game, did not take it with her morning meal, but does this frequently.  No recent changes to her insulin dosing.    Physical Exam     INITIAL VITALS: BP: (!) 162/85, Temp: 97.4 °F (36.3 °C), Pulse: 84, Respirations: 24, SpO2: 97 %     Physical Exam    Nursing note and vitals reviewed.    General:  Adult female, alert and appropriately interactive. In no distress.  HENT: Normocephalic and atraumatic. External ears normal. Nose appears  Negative    Leukocyte Esterase, Urine Negative Negative    Microscopic Examination YES     Urine Type NotGiven     Urine Reflex to Culture Not Indicated    HCG Qualitative, Serum   Result Value Ref Range    Preg, Serum Negative Detects HCG level >10 MIU/mL   Blood Gas, Venous   Result Value Ref Range    pH, Contreras 7.357 7.350 - 7.450    pCO2, Contreras 52.9 (H) 41.0 - 51.0 mmHg    PO2, Contreras 35.5 25.0 - 40.0 mmHg    HCO3, Venous 29.6 (H) 24.0 - 28.0 mmol/L    Base Excess, Contreras 3.2 (H) -2.0 - 3.0 mmol/L    O2 Sat, Contreras 60 Not established %    Carboxyhemoglobin 1.3 0.0 - 1.5 %    MetHgb, Contreras 0.6 0.0 - 1.5 %    TC02 (Calc), Contreras 31 mmol/L    Hemoglobin, Contreras, Reduced 39.50 %   Lactic Acid   Result Value Ref Range    Lactic Acid 4.0 (HH) 0.4 - 2.0 mmol/L   Troponin   Result Value Ref Range    Troponin, High Sensitivity 9 0 - 14 ng/L   Microscopic Urinalysis   Result Value Ref Range    WBC, UA 6-9 (A) 0 - 5 /HPF    RBC, UA 0-2 0 - 4 /HPF    Epithelial Cells, UA 0-1 0 - 5 /HPF    Renal Epithelial, UA 0-1 0 - 1 /HPF    Bacteria, UA 1+ (A) None Seen /HPF   Magnesium   Result Value Ref Range    Magnesium 1.70 (L) 1.80 - 2.40 mg/dL   POCT Glucose   Result Value Ref Range    POC Glucose 132 (H) 70 - 99 mg/dl    Performed on ACCU-CHEK    EKG 12 Lead   Result Value Ref Range    Ventricular Rate 85 BPM    Atrial Rate 85 BPM    P-R Interval 160 ms    QRS Duration 82 ms    Q-T Interval 414 ms    QTc Calculation (Bazett) 492 ms    P Axis 46 degrees    R Axis 67 degrees    T Axis 210 degrees    Diagnosis       Normal sinus rhythmT wave abnormality, consider inferior ischemiaT wave abnormality, consider anterolateral ischemiaProlonged QTAbnormal ECG       MOST RECENT VITALS:  BP: (!) 194/105,Temp: 97.4 °F (36.3 °C), Pulse: 95, Respirations: 22, SpO2: 99 %     Procedures     Procedures    ED Course     Nursing Notes, Past Medical Hx, Past Surgical Hx, Social Hx,Allergies, and Family Hx were reviewed.         The patient was given the following

## 2024-08-17 NOTE — PROGRESS NOTES
Pt arrived on floor. A/Ox 4. Room air. No skin issues noted. Assessment completed. Potassium and Mag running. Call light education given. Bed lowest position, bed locked, grippers socks on and call light within reach.

## 2024-08-17 NOTE — ED NOTES
ED TO INPATIENT SBAR HANDOFF    Patient Name: Astrid Johnson   :  1980  43 y.o.   MRN:  [id number]  Preferred Name  ASTRID  ED Room #:  A03/A03-03  Family/Caregiver Present yes   Restraints no   Sitter no   Sepsis Risk Score      Situation  Code Status: Full Code No additional code details.    Allergies: Penicillins and Sulfa antibiotics  Weight: Patient Vitals for the past 96 hrs (Last 3 readings):   Weight   24 1415 56.8 kg (125 lb 3.2 oz)     Arrived from: home  Chief Complaint:   Chief Complaint   Patient presents with    Loss of Consciousness     LOC at son's football game. EMS gave oral glucose pt is type 2 diabetic. POC glucose on arrival to      Hospital Problem/Diagnosis:  Principal Problem:    AMS (altered mental status)  Resolved Problems:    * No resolved hospital problems. *    Imaging:   CT ABDOMEN PELVIS W IV CONTRAST Additional Contrast? None   Final Result      1. No acute intra-abdominopelvic abnormality.     2. Fibroid uterus.   3. Suspected 1.2 cm hepatic hemangioma. Recommended follow-up with MRI abdomen.      Electronically signed by Nabil Shaw MD      CT Head W/O Contrast   Final Result      No evidence for acute intracranial process. No bleed or shift      Electronically signed by MD Jim Gomes      CTA head neck with contrast    (Results Pending)   MRI brain without contrast    (Results Pending)     Abnormal labs:   Abnormal Labs Reviewed   CBC WITH AUTO DIFFERENTIAL - Abnormal; Notable for the following components:       Result Value    WBC 12.0 (*)     Hemoglobin 10.9 (*)     Hematocrit 34.6 (*)     MCV 74.2 (*)     MCH 23.3 (*)     RDW 16.0 (*)     All other components within normal limits   COMPREHENSIVE METABOLIC PANEL W/ REFLEX TO MG FOR LOW K - Abnormal; Notable for the following components:    Potassium reflex Magnesium 2.5 (*)     Glucose 121 (*)     AST 12 (*)     All other components within normal limits    Narrative:     CALL  Daniel  [facility] tel.  [phone],  Chemistry results called to and read back by Charu Tolbert, 08/17/2024  15:41, by DUNLN   URINALYSIS WITH REFLEX TO CULTURE - Abnormal; Notable for the following components:    Glucose, Ur >=1000 (*)     Protein,  (*)     All other components within normal limits   BLOOD GAS, VENOUS - Abnormal; Notable for the following components:    pCO2, Contreras 52.9 (*)     HCO3, Venous 29.6 (*)     Base Excess, Contreras 3.2 (*)     All other components within normal limits   LACTIC ACID - Abnormal; Notable for the following components:    Lactic Acid 4.0 (*)     All other components within normal limits    Narrative:     CALL  [employer] tel. [phone],  Chemistry results called to and read back by Geo Fuentes, 08/17/2024 15:31, by  DUNLAYLA   MICROSCOPIC URINALYSIS - Abnormal; Notable for the following components:    WBC, UA 6-9 (*)     Bacteria, UA 1+ (*)     All other components within normal limits   MAGNESIUM - Abnormal; Notable for the following components:    Magnesium 1.70 (*)     All other components within normal limits    Narrative:     CALL  [employer] tel. [phone],  Chemistry results called to and read back by Charu Tolbert, 08/17/2024  15:41, by DUNLAYLA   POTASSIUM - Abnormal; Notable for the following components:    Potassium 2.6 (*)     All other components within normal limits    Narrative:     CALL  [employer] tel. [phone],  Chemistry results called to and read back by Dimitri, 08/17/2024 17:02, by DUNLN   POCT GLUCOSE - Abnormal; Notable for the following components:    POC Glucose 132 (*)     All other components within normal limits     Critical values: yes, lactic acid and potassium     Abnormal Assessment Findings: pt presents after syncopal episode at son's football game. Pt has been alert and oriented x4 since arrival to ED. Pt is hypokalemic and requiring potassium replacement.     Background  History:   Past Medical History:   Diagnosis Date    Asthma     Chronic kidney disease      protein/ r/o if pregnacy related or not    Diabetes mellitus (HCC)     gestational    HTN (hypertension)     started this week    Hypertension     Protein in urine     seen by nephrologist       Assessment    Vitals/MEWS:    Level of Consciousness: Alert (0)   Vitals:    08/17/24 1415 08/17/24 1439 08/17/24 1608   BP: (!) 162/85  (!) 194/105   Pulse: 84  95   Resp: 24  22   Temp: 97.4 °F (36.3 °C)     TempSrc: Axillary     SpO2: 97%  99%   Weight: 56.8 kg (125 lb 3.2 oz)     Height:  1.499 m (4' 11\")      FiO2 (%):   O2 Flow Rate: O2 Device: None (Room air)    Cardiac Rhythm:    Pain Assessment:  [x] Verbal [] Wesley Arroyo Scale  Pain Scale:    Last documented pain score (0-10 scale)    Last documented pain medication administered: N/A  Mental Status: oriented and alert  Orientation Level:    NIH Score:    C-SSRS:    Bedside swallow:    Leupp Coma Scale (GCS): Leupp Coma Scale  Eye Opening: Spontaneous  Best Verbal Response: Oriented  Best Motor Response: Obeys commands  Leupp Coma Scale Score: 15  Active LDA's:   Peripheral IV 08/17/24 Left Antecubital (Active)   Site Assessment Clean, dry & intact 08/17/24 1440                 PO Status: Nothing by Mouth  Pertinent or High Risk Medications/Drips: yes   If Yes, please provide details: magnesium and potassium   Pending Blood Product Administration:        You may also review the ED PT Care Timeline found under the Summary Nursing Index tab.    Recommendation    Pending orders   Plan for Discharge (if known):   Additional Comments: pt alert and oriented. Up ad ramón.    If any further questions, please call Sending RN at [phone]    Electronically signed by: Electronically signed by Gunnar Hernandez RN on 8/17/2024 at 5:23 PM      Gunnar Hernandez RN  08/17/24 [id number]

## 2024-08-17 NOTE — PROGRESS NOTES
_4 Eyes Skin Assessment     NAME:  Astrid Johnson  YOB: 1980  MEDICAL RECORD NUMBER:  [id number]    The patient is being assessed for  Admission    I agree that at least one RN has performed a thorough Head to Toe Skin Assessment on the patient. ALL assessment sites listed below have been assessed.      Areas assessed by both nurses:    Head, Face, Ears, Shoulders, Back, Chest, Arms, Elbows, Hands, Sacrum. Buttock, Coccyx, Ischium, Legs. Feet and Heels, and Under Medical Devices         Does the Patient have a Wound? No noted wound(s)       Tunde Prevention initiated by RN: Yes  Wound Care Orders initiated by RN: No    Pressure Injury (Stage 3,4, Unstageable, DTI, NWPT, and Complex wounds) if present, place Wound referral order by RN under : No    New Ostomies, if present place, Ostomy referral order under : No     Nurse 1 eSignature: Electronically signed by SHASHA BELCHER RN on 8/17/24 at 7:04 PM EDT    **SHARE this note so that the co-signing nurse can place an eSignature**    Nurse 2 eSignature: Electronically signed by Millie Nathan RN on 8/17/24 at 7:45 PM EDT

## 2024-08-18 ENCOUNTER — APPOINTMENT (OUTPATIENT)
Dept: MRI IMAGING | Age: 44
DRG: 064 | End: 2024-08-18
Payer: COMMERCIAL

## 2024-08-18 VITALS
WEIGHT: 125.27 LBS | TEMPERATURE: 98.4 F | SYSTOLIC BLOOD PRESSURE: 176 MMHG | OXYGEN SATURATION: 97 % | RESPIRATION RATE: 16 BRPM | DIASTOLIC BLOOD PRESSURE: 99 MMHG | HEIGHT: 59 IN | BODY MASS INDEX: 25.25 KG/M2 | HEART RATE: 80 BPM

## 2024-08-18 LAB
ALBUMIN SERPL-MCNC: 3.8 G/DL (ref 3.4–5)
ALBUMIN/GLOB SERPL: 1.4 {RATIO} (ref 1.1–2.2)
ALP SERPL-CCNC: 66 U/L (ref 40–129)
ALT SERPL-CCNC: 12 U/L (ref 10–40)
ANION GAP SERPL CALCULATED.3IONS-SCNC: 13 MMOL/L (ref 3–16)
AST SERPL-CCNC: 12 U/L (ref 15–37)
BILIRUB SERPL-MCNC: 0.5 MG/DL (ref 0–1)
BUN SERPL-MCNC: 10 MG/DL (ref 7–20)
CALCIUM SERPL-MCNC: 9 MG/DL (ref 8.3–10.6)
CHLORIDE SERPL-SCNC: 98 MMOL/L (ref 99–110)
CHOLEST SERPL-MCNC: 318 MG/DL (ref 0–199)
CO2 SERPL-SCNC: 28 MMOL/L (ref 21–32)
CREAT SERPL-MCNC: 0.7 MG/DL (ref 0.6–1.1)
DEPRECATED RDW RBC AUTO: 16.5 % (ref 12.4–15.4)
EKG ATRIAL RATE: 85 BPM
EKG ATRIAL RATE: 86 BPM
EKG DIAGNOSIS: NORMAL
EKG DIAGNOSIS: NORMAL
EKG P AXIS: 43 DEGREES
EKG P AXIS: 46 DEGREES
EKG P-R INTERVAL: 160 MS
EKG P-R INTERVAL: 192 MS
EKG Q-T INTERVAL: 414 MS
EKG Q-T INTERVAL: 418 MS
EKG QRS DURATION: 80 MS
EKG QRS DURATION: 82 MS
EKG QTC CALCULATION (BAZETT): 492 MS
EKG QTC CALCULATION (BAZETT): 500 MS
EKG R AXIS: 56 DEGREES
EKG R AXIS: 67 DEGREES
EKG T AXIS: -14 DEGREES
EKG T AXIS: 210 DEGREES
EKG VENTRICULAR RATE: 85 BPM
EKG VENTRICULAR RATE: 86 BPM
EST. AVERAGE GLUCOSE BLD GHB EST-MCNC: 263.3 MG/DL
GFR SERPLBLD CREATININE-BSD FMLA CKD-EPI: >90 ML/MIN/{1.73_M2}
GLUCOSE BLD-MCNC: 197 MG/DL (ref 70–99)
GLUCOSE BLD-MCNC: 230 MG/DL (ref 70–99)
GLUCOSE BLD-MCNC: 312 MG/DL (ref 70–99)
GLUCOSE SERPL-MCNC: 209 MG/DL (ref 70–99)
HBA1C MFR BLD: 10.8 %
HCT VFR BLD AUTO: 34.2 % (ref 36–48)
HDLC SERPL-MCNC: 75 MG/DL (ref 40–60)
HGB BLD-MCNC: 10.9 G/DL (ref 12–16)
LDLC SERPL CALC-MCNC: 215 MG/DL
LITHIUM DOSE: ABNORMAL MG
LITHIUM SERPL-MCNC: 0.2 MMOL/L (ref 0.6–1.2)
MAGNESIUM SERPL-MCNC: 2 MG/DL (ref 1.8–2.4)
MAGNESIUM SERPL-MCNC: 2 MG/DL (ref 1.8–2.4)
MCH RBC QN AUTO: 23.6 PG (ref 26–34)
MCHC RBC AUTO-ENTMCNC: 31.9 G/DL (ref 31–36)
MCV RBC AUTO: 73.9 FL (ref 80–100)
PERFORMED ON: ABNORMAL
PLATELET # BLD AUTO: 397 K/UL (ref 135–450)
PMV BLD AUTO: 8.6 FL (ref 5–10.5)
POTASSIUM SERPL-SCNC: 3.3 MMOL/L (ref 3.5–5.1)
POTASSIUM SERPL-SCNC: 3.3 MMOL/L (ref 3.5–5.1)
PROT SERPL-MCNC: 6.6 G/DL (ref 6.4–8.2)
RBC # BLD AUTO: 4.62 M/UL (ref 4–5.2)
SODIUM SERPL-SCNC: 139 MMOL/L (ref 136–145)
TRIGL SERPL-MCNC: 142 MG/DL (ref 0–150)
VLDLC SERPL CALC-MCNC: 28 MG/DL
WBC # BLD AUTO: 11.4 K/UL (ref 4–11)

## 2024-08-18 PROCEDURE — 83036 HEMOGLOBIN GLYCOSYLATED A1C: CPT

## 2024-08-18 PROCEDURE — 80061 LIPID PANEL: CPT

## 2024-08-18 PROCEDURE — 6370000000 HC RX 637 (ALT 250 FOR IP): Performed by: INTERNAL MEDICINE

## 2024-08-18 PROCEDURE — 6360000002 HC RX W HCPCS: Performed by: FAMILY MEDICINE

## 2024-08-18 PROCEDURE — 80053 COMPREHEN METABOLIC PANEL: CPT

## 2024-08-18 PROCEDURE — 93010 ELECTROCARDIOGRAM REPORT: CPT | Performed by: INTERNAL MEDICINE

## 2024-08-18 PROCEDURE — 83735 ASSAY OF MAGNESIUM: CPT

## 2024-08-18 PROCEDURE — 74183 MRI ABD W/O CNTR FLWD CNTR: CPT

## 2024-08-18 PROCEDURE — A9581 GADOXETATE DISODIUM INJ: HCPCS | Performed by: FAMILY MEDICINE

## 2024-08-18 PROCEDURE — 6360000004 HC RX CONTRAST MEDICATION: Performed by: FAMILY MEDICINE

## 2024-08-18 PROCEDURE — 80178 ASSAY OF LITHIUM: CPT

## 2024-08-18 PROCEDURE — 93005 ELECTROCARDIOGRAM TRACING: CPT | Performed by: FAMILY MEDICINE

## 2024-08-18 PROCEDURE — 36415 COLL VENOUS BLD VENIPUNCTURE: CPT

## 2024-08-18 PROCEDURE — 6370000000 HC RX 637 (ALT 250 FOR IP): Performed by: FAMILY MEDICINE

## 2024-08-18 PROCEDURE — 84132 ASSAY OF SERUM POTASSIUM: CPT

## 2024-08-18 PROCEDURE — 70551 MRI BRAIN STEM W/O DYE: CPT

## 2024-08-18 PROCEDURE — 85027 COMPLETE CBC AUTOMATED: CPT

## 2024-08-18 RX ORDER — NIFEDIPINE 30 MG
30 TABLET, EXTENDED RELEASE ORAL DAILY
Status: DISCONTINUED | OUTPATIENT
Start: 2024-08-19 | End: 2024-08-18 | Stop reason: HOSPADM

## 2024-08-18 RX ORDER — INSULIN GLARGINE 100 [IU]/ML
10 INJECTION, SOLUTION SUBCUTANEOUS NIGHTLY
Status: DISCONTINUED | OUTPATIENT
Start: 2024-08-18 | End: 2024-08-18 | Stop reason: HOSPADM

## 2024-08-18 RX ORDER — ASPIRIN 81 MG/1
81 TABLET, CHEWABLE ORAL DAILY
Qty: 30 TABLET | Refills: 3 | Status: SHIPPED | OUTPATIENT
Start: 2024-08-19

## 2024-08-18 RX ORDER — METHIMAZOLE 10 MG/1
10 TABLET ORAL
COMMUNITY

## 2024-08-18 RX ORDER — LOSARTAN POTASSIUM 50 MG/1
100 TABLET ORAL DAILY
Status: DISCONTINUED | OUTPATIENT
Start: 2024-08-19 | End: 2024-08-18 | Stop reason: SDUPTHER

## 2024-08-18 RX ORDER — HYDRALAZINE HYDROCHLORIDE 20 MG/ML
10 INJECTION INTRAMUSCULAR; INTRAVENOUS EVERY 6 HOURS PRN
Status: DISCONTINUED | OUTPATIENT
Start: 2024-08-18 | End: 2024-08-18 | Stop reason: HOSPADM

## 2024-08-18 RX ORDER — INSULIN LISPRO 100 [IU]/ML
0-8 INJECTION, SOLUTION INTRAVENOUS; SUBCUTANEOUS
Status: DISCONTINUED | OUTPATIENT
Start: 2024-08-18 | End: 2024-08-18 | Stop reason: HOSPADM

## 2024-08-18 RX ORDER — LITHIUM CARBONATE 450 MG
450 TABLET, EXTENDED RELEASE ORAL 2 TIMES DAILY
Status: DISCONTINUED | OUTPATIENT
Start: 2024-08-18 | End: 2024-08-18 | Stop reason: HOSPADM

## 2024-08-18 RX ORDER — LITHIUM CARBONATE 450 MG
450 TABLET, EXTENDED RELEASE ORAL 2 TIMES DAILY
COMMUNITY

## 2024-08-18 RX ORDER — DEXTROSE MONOHYDRATE 100 MG/ML
INJECTION, SOLUTION INTRAVENOUS CONTINUOUS PRN
Status: DISCONTINUED | OUTPATIENT
Start: 2024-08-18 | End: 2024-08-18 | Stop reason: SDUPTHER

## 2024-08-18 RX ORDER — ATORVASTATIN CALCIUM 80 MG/1
80 TABLET, FILM COATED ORAL NIGHTLY
Qty: 30 TABLET | Refills: 3 | Status: SHIPPED | OUTPATIENT
Start: 2024-08-18

## 2024-08-18 RX ORDER — LOSARTAN POTASSIUM 50 MG/1
50 TABLET ORAL DAILY
Status: DISCONTINUED | OUTPATIENT
Start: 2024-08-19 | End: 2024-08-18 | Stop reason: HOSPADM

## 2024-08-18 RX ORDER — AMLODIPINE BESYLATE 10 MG/1
10 TABLET ORAL DAILY
Status: DISCONTINUED | OUTPATIENT
Start: 2024-08-19 | End: 2024-08-18 | Stop reason: SDUPTHER

## 2024-08-18 RX ORDER — INSULIN LISPRO 100 [IU]/ML
0-4 INJECTION, SOLUTION INTRAVENOUS; SUBCUTANEOUS NIGHTLY
Status: DISCONTINUED | OUTPATIENT
Start: 2024-08-18 | End: 2024-08-18 | Stop reason: HOSPADM

## 2024-08-18 RX ORDER — METHIMAZOLE 5 MG/1
10 TABLET ORAL
Status: DISCONTINUED | OUTPATIENT
Start: 2024-08-18 | End: 2024-08-18 | Stop reason: HOSPADM

## 2024-08-18 RX ORDER — GLUCAGON 1 MG/ML
1 KIT INJECTION PRN
Status: DISCONTINUED | OUTPATIENT
Start: 2024-08-18 | End: 2024-08-18 | Stop reason: SDUPTHER

## 2024-08-18 RX ADMIN — GADOXETATE DISODIUM 10 ML: 181.43 INJECTION, SOLUTION INTRAVENOUS at 01:23

## 2024-08-18 RX ADMIN — LOSARTAN POTASSIUM 100 MG: 50 TABLET, FILM COATED ORAL at 09:09

## 2024-08-18 RX ADMIN — INSULIN LISPRO 2 UNITS: 100 INJECTION, SOLUTION INTRAVENOUS; SUBCUTANEOUS at 17:59

## 2024-08-18 RX ADMIN — POTASSIUM CHLORIDE 40 MEQ: 1500 TABLET, EXTENDED RELEASE ORAL at 17:58

## 2024-08-18 RX ADMIN — ASPIRIN 81 MG: 81 TABLET, CHEWABLE ORAL at 09:09

## 2024-08-18 RX ADMIN — INSULIN LISPRO 6 UNITS: 100 INJECTION, SOLUTION INTRAVENOUS; SUBCUTANEOUS at 13:40

## 2024-08-18 RX ADMIN — PROPRANOLOL HYDROCHLORIDE 20 MG: 20 TABLET ORAL at 09:09

## 2024-08-18 RX ADMIN — ENOXAPARIN SODIUM 40 MG: 100 INJECTION SUBCUTANEOUS at 09:08

## 2024-08-18 RX ADMIN — METHIMAZOLE 10 MG: 5 TABLET ORAL at 15:47

## 2024-08-18 RX ADMIN — AMLODIPINE BESYLATE 10 MG: 10 TABLET ORAL at 09:09

## 2024-08-18 NOTE — PROGRESS NOTES
4 Eyes Admission Assessment     I agree as the admission nurse that 2 RN's have performed a thorough Head to Toe Skin Assessment on the patient. ALL assessment sites listed below have been assessed on admission.       Areas assessed by both nurses:   [x]   Head, Face, and Ears   [x]   Shoulders, Back, and Chest  [x]   Arms, Elbows, and Hands   [x]   Coccyx, Sacrum, and Ischum  [x]   Legs, Feet, and Heels        Does the Patient have Skin Breakdown?  No         Tunde Prevention initiated:  yes   Wound Care Orders initiated:  No      [facility] nurse consulted for Pressure Injury (Stage 3,4, Unstageable, DTI, NWPT, and Complex wounds):  No      Nurse 1 eSignature: Electronically signed by Juan Chin RN on 8/18/24 at 1:37 AM EDT    **SHARE this note so that the co-signing nurse is able to place an eSignature**    Nurse 2 eSignature: Electronically signed by Saundra Lopez RN on 8/18/24 at 5:52 AM EDT

## 2024-08-18 NOTE — PROGRESS NOTES
Pt signed out AMA. Pt educated on risks of leaving against medical advice.  and brother also at bedside. Pt verbalized understanding and stated she will follow up with PCP and get a neurologist referral. SL and tele removed. Ambulated off unit.

## 2024-08-18 NOTE — H&P
according to patient size, and use of iterative reconstruction technique. IV Contrast: 100 mL Omnipaque 370 Oral Contrast: Yes. FINDINGS: LUNG BASES: Clear. LIVER: 1.2 cm hypodense lesion with peripheral nodular enhancement in the right hepatic lobe.. GALLBLADDER AND BILIARY TREE: No calcified gallstones. No gallbladder distention.  No intra- or extrahepatic biliary dilatation. PANCREAS: Normal. SPLEEN: Normal. ADRENAL GLANDS: Normal. KIDNEYS AND URETERS: No hydronephrosis. Normal enhancement. No urolithiasis. URINARY BLADDER: Normal. REPRODUCTIVE ORGANS: Multiple uterine fibroids. BOWEL: Normal caliber.  Normal appendix. LYMPH NODES: No abnormally enlarged nodes. PERITONEUM/RETROPERITONEUM: No ascites or free air. VESSELS: Aorta is normal caliber.. ABDOMINAL WALL: Normal. BONES: No destructive process.     1. No acute intra-abdominopelvic abnormality.  2. Fibroid uterus. 3. Suspected 1.2 cm hepatic hemangioma. Recommended follow-up with MRI abdomen. Electronically signed by Nabil Shaw MD    CT Head W/O Contrast    Result Date: 8/17/2024  Reason: Syncope CT the head without contrast TECHNIQUE: Collimated helical images are made from skull base to the brain convexity without intravenous contrast. Up-to-date CT equipment and radiation dose reduction techniques were employed. FINDINGS: The visualized paranasal sinuses, mastoid air cells are clear. The gray-white junction is intact, bilaterally. No extra-axial fluid collections. No midline shift.     No evidence for acute intracranial process. No bleed or shift Electronically signed by MD Jim Gomes      PCP: Kayden Acosta MD    Past Medical History:        Diagnosis Date    Asthma     Chronic kidney disease     protein/ r/o if pregnacy related or not    Diabetes mellitus (HCC)     gestational    HTN (hypertension)     started this week    Hypertension     Protein in urine     seen by nephrologist       Past Surgical History:    No past surgical history on  file.    Medications Prior to Admission:   Prior to Admission medications    Medication Sig Start Date End Date Taking? Authorizing Provider   propranolol (INDERAL) 20 MG tablet Take 1 tablet by mouth in the morning, at noon, in the evening, and at bedtime 8/22/22   Anushka Mclaughlin MD   fluconazole (DIFLUCAN) 150 MG tablet Take 1 tablet by mouth See Admin Instructions Repeat dose after completion of the antibiotic. 7/22/24   Ramona Boland, APRN - CNP   amLODIPine-olmesartan (MARIELENA) 10-40 MG per tablet  11/19/21   Anushka Mclaughlin MD   insulin lispro (HUMALOG KWIKPEN) 200 UNIT/ML SOPN pen Inject 40 Units into the skin 3 times daily (with meals)    Anushka Mclaughlin MD   insulin glargine (BASAGLAR KWIKPEN) 100 UNIT/ML injection pen Inject 20 Units into the skin nightly    Anushka Mclaughlin MD   spironolactone (ALDACTONE) 25 MG tablet Take 25 mg by mouth daily  Patient not taking: Reported on 7/22/2024    Anushka Mclaughlin MD   vitamin D (ERGOCALCIFEROL) 1.25 MG (77349 UT) CAPS capsule Take 50,000 Units by mouth once a week  Patient not taking: Reported on 7/22/2024    Anushka Mclaughlin MD   Multiple Vitamins-Minerals (THERAPEUTIC MULTIVITAMIN-MINERALS) tablet Take 1 tablet by mouth daily  Patient not taking: Reported on 7/22/2024    Anushka Mclaughlin MD   Potassium Gluconate 550 MG TABS Take 1 tablet by mouth daily  Patient not taking: Reported on 7/22/2024    Anushka Mclaughlin MD   metoprolol succinate (TOPROL XL) 50 MG extended release tablet Take 1 tablet by mouth daily  Patient not taking: Reported on 7/22/2024 11/22/21   Nghia Varghese PA-C   rosuvastatin (CRESTOR) 20 MG tablet Take 20 mg by mouth daily  11/22/21  Anushka Mclaughlin MD   omeprazole (PRILOSEC) 20 MG delayed release capsule Take 1 capsule by mouth Daily 11/23/20 11/22/21  Eric Gonzalez MD   furosemide (LASIX) 40 MG tablet Take 1 tablet by mouth 2 times daily 11/23/20 11/22/21  Eric Gonzalez MD    losartan (COZAAR) 25 MG tablet Take 1 tablet by mouth daily 10/7/20 11/22/21  Eric Gonzalez MD   atorvastatin (LIPITOR) 10 MG tablet Take 10 mg by mouth 2/9/17 11/22/21  Provider, MD Anushka       Labs: Personally reviewed and interpreted for clinical significance.   Recent Labs     08/17/24  1455   WBC 12.0*   HGB 10.9*   HCT 34.6*        Recent Labs     08/17/24  1455 08/17/24  1612     --    K 2.5* 2.6*     --    CO2 26  --    BUN 9  --    CREATININE 1.0  --    CALCIUM 9.3  --    MG 1.70*  --      Recent Labs     08/17/24  1455 08/17/24  1612   TROPHS 9 14     No results for input(s): \"LABA1C\" in the last 72 hours.  Recent Labs     08/17/24  1455   AST 12*   ALT 15   BILITOT <0.2   ALKPHOS 68     Recent Labs     08/17/24  1455 08/17/24  1714   LACTA 4.0* 2.5*        Jerry Goddard MD

## 2024-08-18 NOTE — PROGRESS NOTES
Progress Note  Admit Date: 8/17/2024             CC: F/U for altered mental status.    43 y.o. female with insulin-dependent diabetes, hypertension, syncope in 2020 who presented to [facility] with altered mental status.     Was at her son's football game, was sitting in a chair, and was found unconscious by her significant other after he returned from helping the football team.  He was concerned her glucose was low and gave her a small glucose tablet, but her sugar was not able to be checked at that time.  She was reportedly unconscious for approximately 25 minutes after she was found up until around the time of EMS arrival.      States she did take her insulin shortly prior to going to the football game, did not take it with her morning meal, but does this frequently.  No recent changes to her insulin dosing. Patient states her sugars typically run in the 3-400s.    She had a glucose in the 150s for EMS and was gradually awakening and becoming more responsive upon transition to the EMS stretcher and ambulance.     She denies any recent falls or injuries.  Does not remember the event. Was not incontinent, no tongue injury.  Has had prior episodes of hypoglycemia where she has been confused but never unconscious for a significant period of time.    She was admitted in 2022 for \"1-2 episodes of near syncope and then 1 episode of syncope in the parking lot\" At that time, work up for syncope was negative and was felt \"...likely a result of not eating and drinking well and loosing fluid through polyuria from uncontrolled diabetes\". Reviewed echo done at that time which looked fairly normal.     In the ED, she was noted with significant bilateral lower abdominal pain. No recent cough, congestion, nausea/vomiting, diarrhea, or dysuria.     In the ER  Tachycardic  Blood pressure was elevated 160s to 190s systolic  White blood cell count 12.0  Hgb 10 range; MCV 74.2  Lactic acid 4.0  Potassium 2.5, Mg 1.7     Troponin  presentation, not explained, and appears to have a tiny acute stroke; may very well be sec to seizures  - Hx of previous syncope in 2022; appears was felt then to be sec to hypoglycemia, hypovolemia, uncontrolled DM  - CT head: nil acute  --MRI head shows small acute ischemia  - She is on insulin at home, appears lantus 20 u and humalog 40 units TID  - Keep on telemetry  - K replenished; monitor and replenish further as needed  - Neuro-checks    Acute right hemispheric CVA: POA  Possible seizure: POA  - Presented following a syncopal episode, noted with lactic acidemia, possible seizure sec to CVA  - MRI: Very small foci of acute ischemia in the right parietal subcortical white matter and right cerebellar hemisphere   - CTA head neck; no LVO  - Optimize risk factor mgt: DM, hyperlipidemia, HTN  - Asa, high intensity statin  - Echo with bubble study, A1C, FLP: LDL: 215  - Telemetry  - Seizure precautions  - Will possibly need at least routine EEG  - Consideration for AED  - Neurology consult    Lactic acidemia: POA  - LA 4; no sepsis, no hypotension; may be sec to hypoglycemia, if she had this; unable to confirm as received glucose; cannot r/o seizure as etiology  - Improved with hydration    Severe hypokalemia; Hypomagnesemia: POA  - K 2.5, Mg 1.7 on presentation  - Possibly sec to meds, diuretics  - Had some EKG changes  - Medical mgt . To get additional potassium  - Monitor and replace as needed     Possible UTI with possible sepsis: POA  - Had mild leukocytosis 12; lactic acidemia; UA with features of infection: 6-9 WBC, 1+ bacteria  - Urine cx: will order as will not reflex  - Empiric antibiotics  pending cx result    Chronic microcytic anemia, likely sec to chronic blood loss from uterine fibroid: POA  - Update anemia labs, radha iron studies  - Monitor hgb    Diabetes Mellitus, on long term insulin: POA  - Update A1C  - Basal-bolus insulin: adjust as needed to control BGs  - POC accuchecks to monitor for

## 2024-08-18 NOTE — PLAN OF CARE
Problem: Metabolic/Fluid and Electrolytes - Adult  Goal: Electrolytes maintained within normal limits  Outcome: Progressing    S/p 4 bags of Potassium 10 meqs IV, awaiting latest Potassium results. MG 1.70 s/p 2G IV MG, awaiting latest results.

## 2024-08-18 NOTE — CONSULTS
Clinical Pharmacy Consult Note  Medication History     Admit Date: 8/17/2024    Pharmacy consulted to verify home medication list by Dr. Tobar.    List of of current medications patient is taking is complete. Home Medication list in EPIC updated to reflect changes noted below.    Source of information: interview with patient    Patient's home pharmacy: [facility]     Changes made to medication list:   Medications removed: (include reason, ex: therapy completed, patient no longer taking, etc.)  Fluconazole - therapy completed  MVI - pt reported not taking  Vitamin D  Spironolactone - pt reports as not taking  Potassium   Amlodipine-olmesartan  Insulin glargine  Metoprolol  Medications added:   Lithium ER 450mg po BID (for hyperthyroidism)  Medication doses adjusted:   Methimazole 10mg po BID  Humalog-->changed to 100units/mL strength; 30units TIDWM  Other notes:       Current Outpatient Medications   Medication Instructions    insulin lispro (1 Unit Dial) (HUMALOG/ADMELOG) 30 Units, SubCUTAneous, 3 TIMES DAILY WITH MEALS    lithium (ESKALITH) 450 mg, Oral, 2 TIMES DAILY    methIMAzole (TAPAZOLE) 10 mg, Oral, 2 TIMES DAILY BEFORE MEALS    propranolol (INDERAL) 20 MG tablet 1 tablet, Oral, 4 times daily       Please call with questions--  Maryam Ibarra, PharmD, [facility]  Wireless: [phone]   8/18/2024 1:46 PM

## 2024-08-18 NOTE — PROGRESS NOTES
Speech Therapy Attempt/Discharge    Patient was attempted to be seen by Speech Therapy Department this date for cognitive evaluation d/t AMS upon admission. Pt and spouse report she is back to her baseline and do not have any concerns regarding her cognitive skills. Pt feels competent to return to work. Pt informed of OP ST services if needed.      No Charge.    Signature:  Annel Penny M.A., CCC-SLP   Speech-Language Pathologist   [facility] PRN  [id number]    Pg. # [phone]

## 2024-08-21 ENCOUNTER — HOSPITAL ENCOUNTER (EMERGENCY)
Age: 44
Discharge: HOME | End: 2024-08-21
Attending: EMERGENCY MEDICINE
Payer: COMMERCIAL

## 2024-08-21 VITALS
WEIGHT: 121.91 LBS | OXYGEN SATURATION: 97 % | DIASTOLIC BLOOD PRESSURE: 87 MMHG | BODY MASS INDEX: 24.62 KG/M2 | RESPIRATION RATE: 16 BRPM | SYSTOLIC BLOOD PRESSURE: 170 MMHG | HEART RATE: 77 BPM | TEMPERATURE: 98.3 F

## 2024-08-21 DIAGNOSIS — E05.90 HYPERTHYROIDISM: ICD-10-CM

## 2024-08-21 DIAGNOSIS — E11.65 POORLY CONTROLLED DIABETES MELLITUS (HCC): ICD-10-CM

## 2024-08-21 DIAGNOSIS — I63.9 ISCHEMIC STROKE (HCC): Primary | ICD-10-CM

## 2024-08-21 DIAGNOSIS — I63.9 CEREBROVASCULAR ACCIDENT (CVA), UNSPECIFIED MECHANISM (HCC): ICD-10-CM

## 2024-08-21 LAB — BACTERIA BLD CULT ORG #2: NORMAL

## 2024-08-21 PROCEDURE — 6370000000 HC RX 637 (ALT 250 FOR IP): Performed by: EMERGENCY MEDICINE

## 2024-08-21 PROCEDURE — 99283 EMERGENCY DEPT VISIT LOW MDM: CPT

## 2024-08-21 RX ORDER — ATORVASTATIN CALCIUM 40 MG/1
80 TABLET, FILM COATED ORAL ONCE
Status: COMPLETED | OUTPATIENT
Start: 2024-08-21 | End: 2024-08-21

## 2024-08-21 RX ORDER — ASPIRIN 81 MG/1
81 TABLET, CHEWABLE ORAL ONCE
Status: COMPLETED | OUTPATIENT
Start: 2024-08-21 | End: 2024-08-21

## 2024-08-21 RX ORDER — AMLODIPINE BESYLATE 5 MG/1
10 TABLET ORAL ONCE
Status: COMPLETED | OUTPATIENT
Start: 2024-08-21 | End: 2024-08-21

## 2024-08-21 RX ADMIN — ATORVASTATIN CALCIUM 80 MG: 40 TABLET, FILM COATED ORAL at 21:04

## 2024-08-21 RX ADMIN — ASPIRIN 81 MG: 81 TABLET, CHEWABLE ORAL at 21:04

## 2024-08-21 RX ADMIN — AMLODIPINE BESYLATE 10 MG: 5 TABLET ORAL at 21:04

## 2024-08-21 ASSESSMENT — PAIN DESCRIPTION - LOCATION: LOCATION: CHEST

## 2024-08-21 ASSESSMENT — LIFESTYLE VARIABLES
HOW OFTEN DO YOU HAVE A DRINK CONTAINING ALCOHOL: NEVER
HOW MANY STANDARD DRINKS CONTAINING ALCOHOL DO YOU HAVE ON A TYPICAL DAY: PATIENT DOES NOT DRINK

## 2024-08-21 ASSESSMENT — PAIN DESCRIPTION - ORIENTATION: ORIENTATION: MID

## 2024-08-21 ASSESSMENT — PAIN - FUNCTIONAL ASSESSMENT: PAIN_FUNCTIONAL_ASSESSMENT: NONE - DENIES PAIN

## 2024-08-21 ASSESSMENT — PAIN SCALES - GENERAL: PAINLEVEL_OUTOF10: 1

## 2024-08-21 ASSESSMENT — PAIN DESCRIPTION - DESCRIPTORS: DESCRIPTORS: SHOOTING

## 2024-08-21 NOTE — ED TRIAGE NOTES
Patient had syncopal episode Saturday, was seen at [facility]. Had an MRI at that time, did not know results. Saw PCP today and was told MRI shows stroke and BP was elevated. BP is 202/72 in triage. No deficits noted.

## 2024-08-22 LAB
BACTERIA BLD CULT: NORMAL
POTASSIUM SERPL-SCNC: 4.1 MMOL/L (ref 3.5–5.1)

## 2024-08-22 ASSESSMENT — ENCOUNTER SYMPTOMS
SHORTNESS OF BREATH: 0
VOMITING: 0
ABDOMINAL PAIN: 0
NAUSEA: 0

## 2024-08-22 NOTE — ED NOTES
D/C: Order noted for d/c. Pt confirmed d/c paperwork has correct name. Discharge and education instructions reviewed with patient and partner, RN emphasized s/s of stroke and that this is a medical emergency and pt or partner should call 9-1-1 . Teach-back successful.  Pt verbalized understanding and denied questions at this time. No acute distress noted. Patient instructed to follow-up as noted - return to emergency department if symptoms worsen. Patient verbalized understanding. Discharged per EDMD with discharge instructions. Pt discharged to private vehicle. Patient stable upon departure. Thanked patient for choosing [facility] for care. Provider aware of patient pain at time of discharge.

## 2024-08-22 NOTE — ED PROVIDER NOTES
[facility] EMERGENCY DEPARTMENT    Name: Astrid Johnson : 1980 MRN: [id number] Date of Service: 2024    Initial VS: BP: (!) , Temp: 98.3 °F (36.8 °C), Pulse: 77, Respirations: 16, SpO2: 97 %     CC: abnormal test result    HPI: this patient is a 43 y.o. female presenting to the ED from home. Ms. Johnson was hospitalized at an outlying facility from 2024 - 2024. During that hospital stay she underwent MRI brain that revealed \"very small foci of acute ischemia in the right parietal subcortical white matter and right cerebellar hemisphere.\" She ultimately left that facility against medical advice. While in the hospital she underwent CT angiography of her head and neck, had a HbA1c checked, and had a fasting lipid panel checked. She is already on antihyperglycemic medication for diabetes mellitus. She was prescribed atorvastatin to begin treatment for hyperlipidemia. She had a follow up appointment with her primary care provider earlier today. In the office she was noted to be significantly hypertensive. Her primary care provider prescribed antihypertensive therapy with amlodipine-olmesartan and atenolol. Upon learning that Ms. Johnson had suffered from an ischemic stroke she recommended that Ms. Johnson return to an emergency department setting for reevaluation. On arrival here Ms. Johnson tells me that she has been feeling very minimally fatigued in recent days. She has not appreciated any other changes from her usual state of health and she denies other current complaints. On further questioning Ms. Johnson notes that she has previously been diagnosed with hyperthyroidism and she is currently taking methimazole and propranolol for management of such. She notes that the endocrinologist managing both her diabetes and hyperthyroidism has an office quite far from her home and it is often difficult for her to secure transportation to follow up at his office. She is interested in referral for

## 2024-08-22 NOTE — DISCHARGE INSTRUCTIONS
Continue taking:  - 81mg (baby) aspirin on a daily basis  - The blood pressure medications that your primary care provider recently prescribed  - Your cholesterol medication (atorvastatin aka Lipitor)  - Methimazole (Tapazole)    STOP taking propranolol as it can interact with (and does essentially the same thing as) the atenolol (Tenormin) that you were just prescribed.    Please contact the endocrinologist listed in your paperwork to schedule a follow up appointment. Talk about what diabetes medications would be appropriate for you and whether or not a thyroid ablation procedure would be appropriate for you.    Be sure to also contact your primary care provider to arrange for a follow up visit. If she is not comfortable managing the sequelae of your recent stroke you can instead follow up with the neurology clinic listed in your paperwork.    If you have any new or worsening issues after going home don't hesitate to return here for reevaluation at any time 24/7!

## 2025-04-29 ENCOUNTER — HOSPITAL ENCOUNTER (OUTPATIENT)
Age: 45
Discharge: HOME | End: 2025-04-29
Payer: COMMERCIAL

## 2025-04-29 LAB
25(OH)D3 SERPL-MCNC: 17.3 NG/ML
ALBUMIN SERPL-MCNC: 4.3 G/DL (ref 3.4–5)
ALBUMIN/GLOB SERPL: 1.5 {RATIO} (ref 1.1–2.2)
ALP SERPL-CCNC: 64 U/L (ref 40–129)
ALT SERPL-CCNC: 18 U/L (ref 10–40)
ANION GAP SERPL CALCULATED.3IONS-SCNC: 11 MMOL/L (ref 3–16)
AST SERPL-CCNC: 20 U/L (ref 15–37)
BILIRUB SERPL-MCNC: 0.5 MG/DL (ref 0–1)
BUN SERPL-MCNC: 11 MG/DL (ref 7–20)
CALCIUM SERPL-MCNC: 9.6 MG/DL (ref 8.3–10.6)
CHLORIDE SERPL-SCNC: 102 MMOL/L (ref 99–110)
CHOLEST SERPL-MCNC: 159 MG/DL (ref 0–199)
CO2 SERPL-SCNC: 25 MMOL/L (ref 21–32)
CREAT SERPL-MCNC: 0.6 MG/DL (ref 0.6–1.1)
EST. AVERAGE GLUCOSE BLD GHB EST-MCNC: 205.9 MG/DL
GFR SERPLBLD CREATININE-BSD FMLA CKD-EPI: >90 ML/MIN/{1.73_M2}
GLUCOSE SERPL-MCNC: 187 MG/DL (ref 70–99)
HBA1C MFR BLD: 8.8 %
HDLC SERPL-MCNC: 67 MG/DL (ref 40–60)
LDLC SERPL CALC-MCNC: 77 MG/DL
POTASSIUM SERPL-SCNC: 4.7 MMOL/L (ref 3.5–5.1)
PROT SERPL-MCNC: 7.2 G/DL (ref 6.4–8.2)
SODIUM SERPL-SCNC: 138 MMOL/L (ref 136–145)
T4 FREE SERPL-MCNC: 1 NG/DL (ref 0.9–1.8)
TRIGL SERPL-MCNC: 73 MG/DL (ref 0–150)
TSH SERPL DL<=0.005 MIU/L-ACNC: 0.93 UIU/ML (ref 0.27–4.2)
VLDLC SERPL CALC-MCNC: 15 MG/DL

## 2025-04-29 PROCEDURE — 84439 ASSAY OF FREE THYROXINE: CPT

## 2025-04-29 PROCEDURE — 36415 COLL VENOUS BLD VENIPUNCTURE: CPT

## 2025-04-29 PROCEDURE — 82306 VITAMIN D 25 HYDROXY: CPT

## 2025-04-29 PROCEDURE — 83036 HEMOGLOBIN GLYCOSYLATED A1C: CPT

## 2025-04-29 PROCEDURE — 80061 LIPID PANEL: CPT

## 2025-04-29 PROCEDURE — 84443 ASSAY THYROID STIM HORMONE: CPT

## 2025-04-29 PROCEDURE — 80053 COMPREHEN METABOLIC PANEL: CPT
